# Patient Record
Sex: FEMALE | Race: OTHER | HISPANIC OR LATINO | URBAN - METROPOLITAN AREA
[De-identification: names, ages, dates, MRNs, and addresses within clinical notes are randomized per-mention and may not be internally consistent; named-entity substitution may affect disease eponyms.]

---

## 2019-10-30 ENCOUNTER — EMERGENCY (EMERGENCY)
Facility: HOSPITAL | Age: 59
LOS: 1 days | Discharge: HOME | End: 2019-10-30
Attending: EMERGENCY MEDICINE
Payer: SELF-PAY

## 2019-10-30 VITALS
DIASTOLIC BLOOD PRESSURE: 80 MMHG | HEIGHT: 62 IN | WEIGHT: 171.96 LBS | TEMPERATURE: 98 F | SYSTOLIC BLOOD PRESSURE: 143 MMHG | RESPIRATION RATE: 16 BRPM | HEART RATE: 85 BPM | OXYGEN SATURATION: 98 %

## 2019-10-30 DIAGNOSIS — Z98.89 OTHER SPECIFIED POSTPROCEDURAL STATES: Chronic | ICD-10-CM

## 2019-10-30 PROCEDURE — 72100 X-RAY EXAM L-S SPINE 2/3 VWS: CPT | Mod: 26

## 2019-10-30 PROCEDURE — 96372 THER/PROPH/DIAG INJ SC/IM: CPT

## 2019-10-30 PROCEDURE — 72040 X-RAY EXAM NECK SPINE 2-3 VW: CPT | Mod: 26

## 2019-10-30 PROCEDURE — 99284 EMERGENCY DEPT VISIT MOD MDM: CPT | Mod: 25

## 2019-10-30 PROCEDURE — T1013: CPT

## 2019-10-30 PROCEDURE — 72100 X-RAY EXAM L-S SPINE 2/3 VWS: CPT

## 2019-10-30 PROCEDURE — 99283 EMERGENCY DEPT VISIT LOW MDM: CPT

## 2019-10-30 PROCEDURE — 72040 X-RAY EXAM NECK SPINE 2-3 VW: CPT

## 2019-10-30 RX ORDER — IBUPROFEN 200 MG
1 TABLET ORAL
Qty: 20 | Refills: 0
Start: 2019-10-30 | End: 2019-11-03

## 2019-10-30 RX ORDER — METHOCARBAMOL 500 MG/1
500 TABLET, FILM COATED ORAL ONCE
Refills: 0 | Status: COMPLETED | OUTPATIENT
Start: 2019-10-30 | End: 2019-10-30

## 2019-10-30 RX ORDER — KETOROLAC TROMETHAMINE 30 MG/ML
30 SYRINGE (ML) INJECTION ONCE
Refills: 0 | Status: DISCONTINUED | OUTPATIENT
Start: 2019-10-30 | End: 2019-10-30

## 2019-10-30 RX ORDER — METHOCARBAMOL 500 MG/1
1 TABLET, FILM COATED ORAL
Qty: 12 | Refills: 0
Start: 2019-10-30 | End: 2019-11-02

## 2019-10-30 RX ADMIN — METHOCARBAMOL 500 MILLIGRAM(S): 500 TABLET, FILM COATED ORAL at 16:21

## 2019-10-30 RX ADMIN — Medication 30 MILLIGRAM(S): at 16:21

## 2019-10-30 NOTE — ED STATDOCS - CARE PLAN
Principal Discharge DX:	Fall, initial encounter  Assessment and plan of treatment:	Continue with pain medication   F/U with PCP  Secondary Diagnosis:	Musculoskeletal pain

## 2019-10-30 NOTE — ED STATDOCS - PATIENT PORTAL LINK FT
You can access the FollowMyHealth Patient Portal offered by [facility] by registering at the following website: [email or web address]. By joining [employer]s FollowMyHealth portal, you will also be able to view your health information using other applications (apps) compatible with our system.

## 2019-10-30 NOTE — ED STATDOCS - OBJECTIVE STATEMENT
60 y/o F pt with no significant PMHx presents to the ED c/o back pain s/p a fall at 14:15. Pt states that she slipped on water and landed on her back, and then began having trouble taking deep breath. She did hit her head, but denies any LOC. Denies being on any blood thinners. NKDA. No further complaints at this time.  PMD:   : Asha 60 y/o F pt with no significant PMHx presents to the ED c/o back pain s/p a fall at 14:15. Pt states that she slipped on water and landed on her back. She did hit her head, but denies any LOC. Denies being on any blood thinners. NKDA. No further complaints at this time.  PMD:   : Asha

## 2019-10-30 NOTE — ED STATDOCS - PROGRESS NOTE DETAILS
Pt X-ray negative for fracture and Pt was made aware of x-ray findings. Pt Feels a lot  better and will Continue pain medication and muscle relaxant.

## 2019-10-30 NOTE — ED STATDOCS - ATTENDING CONTRIBUTION TO CARE
I, Janay Mcginnis, performed a face to face bedside interview with this patient regarding history of present illness, review of symptoms and relevant past medical, social and family history.  I completed an independent physical examination. Medical decision making, follow-up on ordered tests (ie labs, radiologic studies) and re-evaluation of the patient's status has been communicated to the ACP.  Disposition of the patient will be based on test outcome and response to ED interventions.     57yo F with mechanical slip and fall onto back, no LOC. no nause/vomiting. complaint of back and neck paion. ambulatory. no focal weakness. no urinary/bowel changes.     xray c/l spine. motrin/robaxin reasses

## 2020-01-24 ENCOUNTER — APPOINTMENT (OUTPATIENT)
Dept: OBGYN | Facility: CLINIC | Age: 60
End: 2020-01-24
Payer: MEDICAID

## 2020-01-24 VITALS
DIASTOLIC BLOOD PRESSURE: 81 MMHG | BODY MASS INDEX: 29.86 KG/M2 | HEART RATE: 68 BPM | WEIGHT: 163.25 LBS | SYSTOLIC BLOOD PRESSURE: 134 MMHG

## 2020-01-24 DIAGNOSIS — N95.2 POSTMENOPAUSAL ATROPHIC VAGINITIS: ICD-10-CM

## 2020-01-24 DIAGNOSIS — N39.3 STRESS INCONTINENCE (FEMALE) (MALE): ICD-10-CM

## 2020-01-24 DIAGNOSIS — N90.5 ATROPHY OF VULVA: ICD-10-CM

## 2020-01-24 PROCEDURE — 99204 OFFICE O/P NEW MOD 45 MIN: CPT

## 2020-01-24 NOTE — PHYSICAL EXAM
[Awake] : awake [Alert] : alert [Acute Distress] : no acute distress [Mass] : no breast mass [Nipple Discharge] : no nipple discharge [Axillary LAD] : no axillary lymphadenopathy [Soft] : soft [Tender] : non tender [Oriented x3] : oriented to person, place, and time [Normal] : uterus [Atrophy] : atrophy [Cystocele] : a cystocele [Rectocele] : a rectocele [Dry Mucosa] : dry mucosa [Discharge] : no discharge [No Bleeding] : there was no active vaginal bleeding [Uterine Adnexae] : were not tender and not enlarged

## 2020-01-27 LAB
C TRACH RRNA SPEC QL NAA+PROBE: NOT DETECTED
HPV HIGH+LOW RISK DNA PNL CVX: NOT DETECTED
N GONORRHOEA RRNA SPEC QL NAA+PROBE: NOT DETECTED
SOURCE TP AMPLIFICATION: NORMAL

## 2020-01-31 LAB — CYTOLOGY CVX/VAG DOC THIN PREP: ABNORMAL

## 2020-02-28 ENCOUNTER — TRANSCRIPTION ENCOUNTER (OUTPATIENT)
Age: 60
End: 2020-02-28

## 2021-04-28 ENCOUNTER — TRANSCRIPTION ENCOUNTER (OUTPATIENT)
Age: 61
End: 2021-04-28

## 2021-05-08 ENCOUNTER — TRANSCRIPTION ENCOUNTER (OUTPATIENT)
Age: 61
End: 2021-05-08

## 2021-08-11 ENCOUNTER — TRANSCRIPTION ENCOUNTER (OUTPATIENT)
Age: 61
End: 2021-08-11

## 2021-09-13 ENCOUNTER — INPATIENT (INPATIENT)
Facility: HOSPITAL | Age: 61
LOS: 1 days | Discharge: HOME | DRG: 502 | End: 2021-09-15
Attending: ORTHOPAEDIC SURGERY | Admitting: ORTHOPAEDIC SURGERY
Payer: COMMERCIAL

## 2021-09-13 ENCOUNTER — TRANSCRIPTION ENCOUNTER (OUTPATIENT)
Age: 61
End: 2021-09-13

## 2021-09-13 VITALS
SYSTOLIC BLOOD PRESSURE: 146 MMHG | DIASTOLIC BLOOD PRESSURE: 81 MMHG | HEIGHT: 62 IN | OXYGEN SATURATION: 97 % | TEMPERATURE: 98 F | WEIGHT: 149.91 LBS | RESPIRATION RATE: 16 BRPM | HEART RATE: 72 BPM

## 2021-09-13 DIAGNOSIS — S42.402A UNSPECIFIED FRACTURE OF LOWER END OF LEFT HUMERUS, INITIAL ENCOUNTER FOR CLOSED FRACTURE: ICD-10-CM

## 2021-09-13 DIAGNOSIS — Z98.89 OTHER SPECIFIED POSTPROCEDURAL STATES: Chronic | ICD-10-CM

## 2021-09-13 LAB
ALBUMIN SERPL ELPH-MCNC: 4.3 G/DL — SIGNIFICANT CHANGE UP (ref 3.3–5.2)
ALP SERPL-CCNC: 86 U/L — SIGNIFICANT CHANGE UP (ref 40–120)
ALT FLD-CCNC: 18 U/L — SIGNIFICANT CHANGE UP
ANION GAP SERPL CALC-SCNC: 13 MMOL/L — SIGNIFICANT CHANGE UP (ref 5–17)
APTT BLD: 32.8 SEC — SIGNIFICANT CHANGE UP (ref 27.5–35.5)
AST SERPL-CCNC: 28 U/L — SIGNIFICANT CHANGE UP
BASOPHILS # BLD AUTO: 0.04 K/UL — SIGNIFICANT CHANGE UP (ref 0–0.2)
BASOPHILS NFR BLD AUTO: 0.6 % — SIGNIFICANT CHANGE UP (ref 0–2)
BILIRUB SERPL-MCNC: 0.3 MG/DL — LOW (ref 0.4–2)
BUN SERPL-MCNC: 16.7 MG/DL — SIGNIFICANT CHANGE UP (ref 8–20)
CALCIUM SERPL-MCNC: 9.7 MG/DL — SIGNIFICANT CHANGE UP (ref 8.6–10.2)
CHLORIDE SERPL-SCNC: 102 MMOL/L — SIGNIFICANT CHANGE UP (ref 98–107)
CO2 SERPL-SCNC: 25 MMOL/L — SIGNIFICANT CHANGE UP (ref 22–29)
CREAT SERPL-MCNC: 0.76 MG/DL — SIGNIFICANT CHANGE UP (ref 0.5–1.3)
EOSINOPHIL # BLD AUTO: 0.12 K/UL — SIGNIFICANT CHANGE UP (ref 0–0.5)
EOSINOPHIL NFR BLD AUTO: 1.7 % — SIGNIFICANT CHANGE UP (ref 0–6)
GLUCOSE SERPL-MCNC: 190 MG/DL — HIGH (ref 70–99)
HCT VFR BLD CALC: 39 % — SIGNIFICANT CHANGE UP (ref 34.5–45)
HGB BLD-MCNC: 13.1 G/DL — SIGNIFICANT CHANGE UP (ref 11.5–15.5)
IMM GRANULOCYTES NFR BLD AUTO: 0.1 % — SIGNIFICANT CHANGE UP (ref 0–1.5)
INR BLD: 1.06 RATIO — SIGNIFICANT CHANGE UP (ref 0.88–1.16)
LYMPHOCYTES # BLD AUTO: 1.73 K/UL — SIGNIFICANT CHANGE UP (ref 1–3.3)
LYMPHOCYTES # BLD AUTO: 24.4 % — SIGNIFICANT CHANGE UP (ref 13–44)
MCHC RBC-ENTMCNC: 28.3 PG — SIGNIFICANT CHANGE UP (ref 27–34)
MCHC RBC-ENTMCNC: 33.6 GM/DL — SIGNIFICANT CHANGE UP (ref 32–36)
MCV RBC AUTO: 84.2 FL — SIGNIFICANT CHANGE UP (ref 80–100)
MONOCYTES # BLD AUTO: 0.51 K/UL — SIGNIFICANT CHANGE UP (ref 0–0.9)
MONOCYTES NFR BLD AUTO: 7.2 % — SIGNIFICANT CHANGE UP (ref 2–14)
NEUTROPHILS # BLD AUTO: 4.67 K/UL — SIGNIFICANT CHANGE UP (ref 1.8–7.4)
NEUTROPHILS NFR BLD AUTO: 66 % — SIGNIFICANT CHANGE UP (ref 43–77)
PLATELET # BLD AUTO: 168 K/UL — SIGNIFICANT CHANGE UP (ref 150–400)
POTASSIUM SERPL-MCNC: 4 MMOL/L — SIGNIFICANT CHANGE UP (ref 3.5–5.3)
POTASSIUM SERPL-SCNC: 4 MMOL/L — SIGNIFICANT CHANGE UP (ref 3.5–5.3)
PROT SERPL-MCNC: 7.4 G/DL — SIGNIFICANT CHANGE UP (ref 6.6–8.7)
PROTHROM AB SERPL-ACNC: 12.3 SEC — SIGNIFICANT CHANGE UP (ref 10.6–13.6)
RBC # BLD: 4.63 M/UL — SIGNIFICANT CHANGE UP (ref 3.8–5.2)
RBC # FLD: 13 % — SIGNIFICANT CHANGE UP (ref 10.3–14.5)
SARS-COV-2 RNA SPEC QL NAA+PROBE: SIGNIFICANT CHANGE UP
SODIUM SERPL-SCNC: 140 MMOL/L — SIGNIFICANT CHANGE UP (ref 135–145)
WBC # BLD: 7.08 K/UL — SIGNIFICANT CHANGE UP (ref 3.8–10.5)
WBC # FLD AUTO: 7.08 K/UL — SIGNIFICANT CHANGE UP (ref 3.8–10.5)

## 2021-09-13 PROCEDURE — 99157 MOD SED OTHER PHYS/QHP EA: CPT

## 2021-09-13 PROCEDURE — 99222 1ST HOSP IP/OBS MODERATE 55: CPT

## 2021-09-13 PROCEDURE — 99221 1ST HOSP IP/OBS SF/LOW 40: CPT

## 2021-09-13 PROCEDURE — 72170 X-RAY EXAM OF PELVIS: CPT | Mod: 26

## 2021-09-13 PROCEDURE — 99285 EMERGENCY DEPT VISIT HI MDM: CPT | Mod: 25

## 2021-09-13 PROCEDURE — 71045 X-RAY EXAM CHEST 1 VIEW: CPT | Mod: 26

## 2021-09-13 PROCEDURE — 73080 X-RAY EXAM OF ELBOW: CPT | Mod: 26,LT,76

## 2021-09-13 PROCEDURE — 99156 MOD SED OTH PHYS/QHP 5/>YRS: CPT

## 2021-09-13 PROCEDURE — 99233 SBSQ HOSP IP/OBS HIGH 50: CPT | Mod: 57

## 2021-09-13 RX ORDER — GLUCAGON INJECTION, SOLUTION 0.5 MG/.1ML
1 INJECTION, SOLUTION SUBCUTANEOUS ONCE
Refills: 0 | Status: DISCONTINUED | OUTPATIENT
Start: 2021-09-13 | End: 2021-09-14

## 2021-09-13 RX ORDER — DEXTROSE 50 % IN WATER 50 %
25 SYRINGE (ML) INTRAVENOUS ONCE
Refills: 0 | Status: DISCONTINUED | OUTPATIENT
Start: 2021-09-13 | End: 2021-09-14

## 2021-09-13 RX ORDER — SODIUM CHLORIDE 9 MG/ML
1000 INJECTION, SOLUTION INTRAVENOUS
Refills: 0 | Status: DISCONTINUED | OUTPATIENT
Start: 2021-09-13 | End: 2021-09-14

## 2021-09-13 RX ORDER — CEFAZOLIN SODIUM 1 G
2000 VIAL (EA) INJECTION ONCE
Refills: 0 | Status: DISCONTINUED | OUTPATIENT
Start: 2021-09-14 | End: 2021-09-14

## 2021-09-13 RX ORDER — KETAMINE HYDROCHLORIDE 100 MG/ML
35 INJECTION INTRAMUSCULAR; INTRAVENOUS ONCE
Refills: 0 | Status: DISCONTINUED | OUTPATIENT
Start: 2021-09-13 | End: 2021-09-13

## 2021-09-13 RX ORDER — OXYCODONE HYDROCHLORIDE 5 MG/1
10 TABLET ORAL EVERY 4 HOURS
Refills: 0 | Status: DISCONTINUED | OUTPATIENT
Start: 2021-09-13 | End: 2021-09-14

## 2021-09-13 RX ORDER — DEXTROSE 50 % IN WATER 50 %
12.5 SYRINGE (ML) INTRAVENOUS ONCE
Refills: 0 | Status: DISCONTINUED | OUTPATIENT
Start: 2021-09-13 | End: 2021-09-14

## 2021-09-13 RX ORDER — HYDROMORPHONE HYDROCHLORIDE 2 MG/ML
0.5 INJECTION INTRAMUSCULAR; INTRAVENOUS; SUBCUTANEOUS EVERY 6 HOURS
Refills: 0 | Status: DISCONTINUED | OUTPATIENT
Start: 2021-09-13 | End: 2021-09-14

## 2021-09-13 RX ORDER — ONDANSETRON 8 MG/1
4 TABLET, FILM COATED ORAL ONCE
Refills: 0 | Status: COMPLETED | OUTPATIENT
Start: 2021-09-13 | End: 2021-09-13

## 2021-09-13 RX ORDER — MORPHINE SULFATE 50 MG/1
4 CAPSULE, EXTENDED RELEASE ORAL ONCE
Refills: 0 | Status: DISCONTINUED | OUTPATIENT
Start: 2021-09-13 | End: 2021-09-13

## 2021-09-13 RX ORDER — HYDROMORPHONE HYDROCHLORIDE 2 MG/ML
4 INJECTION INTRAMUSCULAR; INTRAVENOUS; SUBCUTANEOUS EVERY 4 HOURS
Refills: 0 | Status: DISCONTINUED | OUTPATIENT
Start: 2021-09-13 | End: 2021-09-14

## 2021-09-13 RX ORDER — DEXTROSE 50 % IN WATER 50 %
15 SYRINGE (ML) INTRAVENOUS ONCE
Refills: 0 | Status: DISCONTINUED | OUTPATIENT
Start: 2021-09-13 | End: 2021-09-14

## 2021-09-13 RX ORDER — INSULIN LISPRO 100/ML
VIAL (ML) SUBCUTANEOUS
Refills: 0 | Status: DISCONTINUED | OUTPATIENT
Start: 2021-09-13 | End: 2021-09-14

## 2021-09-13 RX ORDER — ACETAMINOPHEN 500 MG
975 TABLET ORAL EVERY 8 HOURS
Refills: 0 | Status: DISCONTINUED | OUTPATIENT
Start: 2021-09-13 | End: 2021-09-14

## 2021-09-13 RX ORDER — OXYCODONE HYDROCHLORIDE 5 MG/1
5 TABLET ORAL EVERY 4 HOURS
Refills: 0 | Status: DISCONTINUED | OUTPATIENT
Start: 2021-09-13 | End: 2021-09-14

## 2021-09-13 RX ADMIN — MORPHINE SULFATE 4 MILLIGRAM(S): 50 CAPSULE, EXTENDED RELEASE ORAL at 18:48

## 2021-09-13 RX ADMIN — HYDROMORPHONE HYDROCHLORIDE 0.5 MILLIGRAM(S): 2 INJECTION INTRAMUSCULAR; INTRAVENOUS; SUBCUTANEOUS at 23:22

## 2021-09-13 RX ADMIN — MORPHINE SULFATE 4 MILLIGRAM(S): 50 CAPSULE, EXTENDED RELEASE ORAL at 20:54

## 2021-09-13 RX ADMIN — SODIUM CHLORIDE 85 MILLILITER(S): 9 INJECTION, SOLUTION INTRAVENOUS at 23:59

## 2021-09-13 RX ADMIN — KETAMINE HYDROCHLORIDE 35 MILLIGRAM(S): 100 INJECTION INTRAMUSCULAR; INTRAVENOUS at 19:32

## 2021-09-13 RX ADMIN — HYDROMORPHONE HYDROCHLORIDE 0.5 MILLIGRAM(S): 2 INJECTION INTRAMUSCULAR; INTRAVENOUS; SUBCUTANEOUS at 23:59

## 2021-09-13 NOTE — CONSULT NOTE ADULT - ATTENDING COMMENTS
61F fall down stairs, no LOC,  seen and examined 09-13-21 @ 2320 as trauma consult.    GCS = 15  hemodynamically stable  NC/AT  PERRL  EOMI  no c-spine tenderness or limit in full active ROM  no t-spine or l-spine tenderness  no chest wall tenderness  soft / NT / ND  pelvic girdle stable  left arm in splint  no gross neurologic deficit    CXR - no pneumothorax, hemothorax or displaced rib fractures  X-ray pelvis - no fractures  left elbow X-ray - proximal radius / ulnar fractures      proximal left radius/ulnar fractures  -care as per orthopedic surgery

## 2021-09-13 NOTE — ED PROVIDER NOTE - PHYSICAL EXAMINATION
Constitutional - well-developed; well nourished. Head - NCAT. Airway patent. Eyes - PERRL. CV - RRR. no murmur. no edema. Pulm - CTAB. Abd - soft, nt. no rebound. no guarding. Neuro - A&Ox3. strength 5/5 x4. sensation intact x4. normal gait. Skin - No rash. MSK - L elbow ttp. nvi distally. no c-spine ttp.

## 2021-09-13 NOTE — H&P ADULT - ATTENDING COMMENTS
Orthopaedic Trauma Surgeon Addendum:    I have personally performed a face-to-face diagnostic evaluation on this patient.  I have reviewed the physician assistant note and agree with the history, exam, and plan of care, except as noted.    Orthopedic Surgery is ready to proceed with surgery pending medical optimization and adequate Operating Room availability. Risks of surgical delay discussed with other providers and staff. Please call with any questions or concerns.     Steven Martell MD  Orthopaedic Trauma Surgeon  [facility] [place]

## 2021-09-13 NOTE — ED ADULT NURSE NOTE - OBJECTIVE STATEMENT
pt with injury to left arm s.p fall, deformity noted to left elbow. pt AOX3, c.o 10/10 pain. states fall down 3 steps, no loc no head injury, even and unlabored resps, mechanical fall.

## 2021-09-13 NOTE — ED PROVIDER NOTE - CLINICAL SUMMARY MEDICAL DECISION MAKING FREE TEXT BOX
Pt with fall onto L elbow resulting in comminuted elbow Fx.  ortho splinted. trauma cleared for ortho to admit. Dr. Jauregui to admit for operative management.

## 2021-09-13 NOTE — ED ADULT NURSE REASSESSMENT NOTE - NS ED NURSE REASSESS COMMENT FT1
pt aware of need for conscious sedation. pt awake, alert, ortho at bedside, pt prepped for procedure. consent signed, IV site patent, VSS, ETc02 monitoring in place.

## 2021-09-13 NOTE — ED ADULT TRIAGE NOTE - CHIEF COMPLAINT QUOTE
Patient BIBA from home, patient states that she tripped and fell down 3 stairs at home. C/O pain to her left ankle and left elbow. Denies hitting her head. -LOC

## 2021-09-13 NOTE — ED PROVIDER NOTE - OBJECTIVE STATEMENT
Pt is a 60 yo F co fall.  Pt states that she was coming out of her house when she missed a step and fell 4-5 stairs primarily onto her L elbow. Pt states that since then she has had severe L elbow pain. Pt also hit her L ankle and head. pt did not have any loss of consciousness. no AC. no other complaints.

## 2021-09-13 NOTE — CONSULT NOTE ADULT - ASSESSMENT
ASSESSMENT: Patient is a 61y old female with PMH of DM presented s/p mechanical fall with isolated elbow fx, and no other injuries identified on exam.     PLAN:    - Dispo per Ortho surgery  - f/u CXR/pelvic xr    - Plan discussed with Attending, Dr. Grey     ASSESSMENT: Patient is a 61y old female with PMH of DM presented s/p mechanical fall with isolated elbow fx, and no other injuries identified on exam.     PLAN:    - Trauma surgery admission not indicated  - Dispo per Ortho surgery  - f/u CXR/pelvic xr    - Plan discussed with Attending, Dr. Grey     ASSESSMENT: Patient is a 61y old female with PMH of DM presented s/p mechanical fall with isolated elbow fx, and no other injuries identified on exam.     PLAN:    - Trauma surgery admission not indicated  - Dispo per Ortho surgery  - f/u CXR/pelvic xr  - Will require a tertiary post op     - Plan discussed with Attending, Dr. Grey

## 2021-09-13 NOTE — H&P ADULT - HISTORY OF PRESENT ILLNESS
Pt Name: REYNALDO DIAZ    MRN: [id number]      Patient is a 61y Female presenting to the emergency department with a chief complaint of left elbow pain s/p fall down approx 4 stairs. Pt denies LOC, h/a, neck/back pain, c/p, sob, abdominal pain, n/v, numbness/tingling/weakness, and has no other complaints at this time.    REVIEW OF SYSTEMS    General: Alert, responsive, in NAD    Skin/Breast: No rashes, no pruritis   	  Ophthalmologic: No visual changes. No redness.   	  ENMT:	No discharge. No swelling.    Respiratory and Thorax: No difficulty breathing. No cough.  	   Cardiovascular:	No chest pain. No palpitations.    Gastrointestinal:	 No abdominal pain. No diarrhea.     Genitourinary: No dysuria. No bleeding.    Musculoskeletal: SEE HPI.    Neurological: No sensory or motor changes.     Psychiatric: No anxiety or depression.    Hematology/Lymphatics: No swelling.    Endocrine: No Hx of diabetes.    ROS is otherwise negative.    PAST MEDICAL & SURGICAL HISTORY:  PAST MEDICAL & SURGICAL HISTORY:  No pertinent past medical history    S/P   x2        Allergies: No Known Allergies      Medications: acetaminophen   Tablet .. 975 milliGRAM(s) Oral every 8 hours  dextrose 40% Gel 15 Gram(s) Oral once  dextrose 5%. 1000 milliLiter(s) IV Continuous <Continuous>  dextrose 5%. 1000 milliLiter(s) IV Continuous <Continuous>  dextrose 50% Injectable 25 Gram(s) IV Push once  dextrose 50% Injectable 12.5 Gram(s) IV Push once  dextrose 50% Injectable 25 Gram(s) IV Push once  glucagon  Injectable 1 milliGRAM(s) IntraMuscular once  HYDROmorphone   Tablet 4 milliGRAM(s) Oral every 4 hours PRN  HYDROmorphone  Injectable 0.5 milliGRAM(s) IV Push every 6 hours PRN  insulin lispro (ADMELOG) corrective regimen sliding scale   SubCutaneous three times a day before meals  oxyCODONE    IR 5 milliGRAM(s) Oral every 4 hours PRN  oxyCODONE    IR 10 milliGRAM(s) Oral every 4 hours PRN      FAMILY HISTORY:  Family history of bronchitis (Father)    Family history of diabetes mellitus (Mother)    : non-contributory    Social History: Denies ETOH abuse.    Ambulation: Walking independently [ x ] With Cane [ ] With Walker [ ]  Bedbound [ ]                           13.1   7.08  )-----------( 168      ( 13 Sep 2021 18:50 )             39.0       09-13    140  |  102  |  16.7  ----------------------------<  190<H>  4.0   |  25.0  |  0.76    Ca    9.7      13 Sep 2021 18:50    TPro  7.4  /  Alb  4.3  /  TBili  0.3<L>  /  DBili  x   /  AST  28  /  ALT  18  /  AlkPhos  86  09-13      Vital Signs Last 24 Hrs  T(C): 36.8 (13 Sep 2021 17:46), Max: 36.8 (13 Sep 2021 17:46)  T(F): 98.3 (13 Sep 2021 17:46), Max: 98.3 (13 Sep 2021 17:46)  HR: 76 (13 Sep 2021 20:00) (72 - 104)  BP: 155/82 (13 Sep 2021 20:00) (146/81 - 170/88)  BP(mean): --  RR: 18 (13 Sep 2021 20:00) (16 - 18)  SpO2: 99% (13 Sep 2021 20:00) (97% - 100%)    Daily Height in cm: 157.48 (13 Sep 2021 17:46)    Daily       PHYSICAL EXAM:      Appearance: Alert, responsive, in no acute distress.    Neurological: Sensation is grossly intact to light touch. 5/5 motor function of all extremities. No focal deficits or weaknesses found.    Skin: no rash on visible skin. Skin is clean, dry and intact. No bleeding. No abrasions. No ulcerations.    Vascular: 2+ distal radial/DP/PT pulses. Cap refill < 2 sec. No signs of venous insufficiency or stasis. No extremity ulcerations. No cyanosis.    Musculoskeletal:         Left Upper Extremity: + TTP of the left elbow with mild deformity noted, SILT, 2+ radial pulse, cap refill < 2sec, +flexion/extension of wrist, +flexion/extension/abduction/adduction of all digits. Compartments soft and compressible, No open wounds or active bleeding noted.       Right Upper Extremity:  + NROM. Non-tender. No signs of trauma.        Left Lower Extremity:  + NROM. Non-tender. No signs of trauma.        Right Lower Extremity:  + NROM. Non-tender. No signs of trauma.     Imaging Studies: All imaging reviewed with Dr. Martell    Procedure: JOINT REDUCTION  PROCEDURE NOTE: Joint reduction     Performed by: Peña Sommers PA-C    Indication: Dislocation of  left elbow    Consent: The risks and benefits of the procedure including incomplete reduction, secondary fracture, nerve damage and bleeding were explained and the patient verbalized their understanding and wished to proceed with the procedure. Written consent was obtained following the discussion.    Universal Protocol: a time out was performed and the correct patient and site were verified     Procedure: Neurovascular exam intact prior to joint reduction.  Skin exam: no bleeding or lacerations at the fracture site. Conscious sedation performed by emergency department attending physician. Reduction of the left elbow was accomplished via traction and careful manipulation.  The joint was immobilized with splint.  Distally, the extremity was neurovascular intact following the procedure.  The patient tolerated the procedure well.    Post reduction films obtained and demonstrated an adequate reduction.    Complications: None     A/P:  Pt is a  61y Female with a left elbow fx/dislocation, radial head fx s/p fall today.    PLAN d/w Dr. Martell:   * NPO for OR tomorrow  * IV fluids ordered and to start once NPO  * Pre-operative ABX ordered  * Medical clearance requested for procedure  * QUINTIN MOURA

## 2021-09-13 NOTE — ED ADULT NURSE REASSESSMENT NOTE - NS ED NURSE REASSESS COMMENT FT1
pt awake and alert even and unlabored resps present, skin warm dry and intact, IV site patent. pt to be admitted, awaiting admission. pt in no distress

## 2021-09-13 NOTE — ED PROCEDURE NOTE - NS_POSTPROCCAREGUIDE_ED_ALL_ED
Patient is now fully awake, with vital signs and temperature stable, hydration is adequate, patients Fariha’s  score is at baseline (or greater than 8), patient and escort has received  discharge education.

## 2021-09-13 NOTE — CONSULT NOTE ADULT - SUBJECTIVE AND OBJECTIVE BOX
TRAUMA CONSULT     HPI: 61y Female present to ED s/p mechanical fall down 4-5 concrete steps when she lost her footing. She is accompanied by her son. She attempted to brace her fall with her L arm but ultimately fell on her L side and face. Reporting only pain is in her L elbow and some discomfort in her L breast, non-reproducible. Denies LOC. Denies SOB, abdominal pain.     Consult called at:   Consult seen at:     ROS: 10-system review is otherwise negative except HPI above.      PAST MEDICAL & SURGICAL HISTORY:  No pertinent past medical history    S/P   x2      FAMILY HISTORY:  Family history of bronchitis (Father)    Family history of diabetes mellitus (Mother)      Family history not pertinent as reviewed with the patient.    SOCIAL HISTORY:  Denies any toxic habits    ALLERGIES: NKA No Known Allergies      HOME MEDICATIONS:      --------------------------------------------------------------------------------------------    PHYSICAL EXAM:   General: NAD, Lying in bed comfortably  Neuro: CN II-XII grossly intact  HEENT: NC/AT  Cardio: RRR  Resp: Non labored breathing on RA, CTA BL  GI/Abd: Soft, NT/ND, no rebound/guarding, no masses palpated  Vascular: All 4 extremities warm and well perfused.   Pelvis: stable  Musculoskeletal: All 4 extremities moving spontaneously, LUE in splint and ACE wrap  --------------------------------------------------------------------------------------------    LABS                 13.1   7.08   )----------(  168       ( 13 Sep 2021 18:50 )               39.0      140    |  102    |  16.7   ----------------------------<  190        ( 13 Sep 2021 18:50 )  4.0     |  25.0   |  0.76     Ca    9.7        ( 13 Sep 2021 18:50 )    TPro  7.4    /  Alb  4.3    /  TBili  0.3    /  DBili  x      /  AST  28     /  ALT  18     /  AlkPhos  86     ( 13 Sep 2021 18:50 )    LIVER FUNCTIONS - ( 13 Sep 2021 18:50 )  Alb: 4.3 g/dL / Pro: 7.4 g/dL / ALK PHOS: 86 U/L / ALT: 18 U/L / AST: 28 U/L / GGT: x           PT/INR -  12.3 sec / 1.06 ratio   ( 13 Sep 2021 18:50 )       PTT -  32.8 sec   ( 13 Sep 2021 18:50 )  CAPILLARY BLOOD GLUCOSE              --------------------------------------------------------------------------------------------  IMAGING  CXR pending    PXR pending

## 2021-09-14 ENCOUNTER — TRANSCRIPTION ENCOUNTER (OUTPATIENT)
Age: 61
End: 2021-09-14

## 2021-09-14 LAB
A1C WITH ESTIMATED AVERAGE GLUCOSE RESULT: 6.8 % — HIGH (ref 4–5.6)
ESTIMATED AVERAGE GLUCOSE: 148 MG/DL — HIGH (ref 68–114)
GLUCOSE BLDC GLUCOMTR-MCNC: 148 MG/DL — HIGH (ref 70–99)
GLUCOSE BLDC GLUCOMTR-MCNC: 217 MG/DL — HIGH (ref 70–99)
GLUCOSE BLDC GLUCOMTR-MCNC: 218 MG/DL — HIGH (ref 70–99)
GLUCOSE BLDC GLUCOMTR-MCNC: 228 MG/DL — HIGH (ref 70–99)
HCV AB S/CO SERPL IA: 0.13 S/CO — SIGNIFICANT CHANGE UP (ref 0–0.99)
HCV AB SERPL-IMP: SIGNIFICANT CHANGE UP

## 2021-09-14 PROCEDURE — 99232 SBSQ HOSP IP/OBS MODERATE 35: CPT

## 2021-09-14 PROCEDURE — 93010 ELECTROCARDIOGRAM REPORT: CPT

## 2021-09-14 PROCEDURE — 99231 SBSQ HOSP IP/OBS SF/LOW 25: CPT | Mod: GC

## 2021-09-14 PROCEDURE — 24685 OPTX ULNAR FX PROX END W/FIX: CPT | Mod: LT

## 2021-09-14 PROCEDURE — 24635 OPTX MONTEGGIA FX DISLC ELBW: CPT | Mod: LT

## 2021-09-14 PROCEDURE — 73080 X-RAY EXAM OF ELBOW: CPT | Mod: 26,LT

## 2021-09-14 PROCEDURE — 73200 CT UPPER EXTREMITY W/O DYE: CPT | Mod: 26,LT

## 2021-09-14 PROCEDURE — 24666 OPTX RADIAL HEAD/NCK FX RPLC: CPT | Mod: LT

## 2021-09-14 RX ORDER — CEFAZOLIN SODIUM 1 G
2000 VIAL (EA) INJECTION EVERY 8 HOURS
Refills: 0 | Status: COMPLETED | OUTPATIENT
Start: 2021-09-14 | End: 2021-09-15

## 2021-09-14 RX ORDER — DEXTROSE 50 % IN WATER 50 %
25 SYRINGE (ML) INTRAVENOUS ONCE
Refills: 0 | Status: DISCONTINUED | OUTPATIENT
Start: 2021-09-14 | End: 2021-09-15

## 2021-09-14 RX ORDER — SODIUM CHLORIDE 9 MG/ML
1000 INJECTION, SOLUTION INTRAVENOUS
Refills: 0 | Status: DISCONTINUED | OUTPATIENT
Start: 2021-09-14 | End: 2021-09-15

## 2021-09-14 RX ORDER — SODIUM CHLORIDE 9 MG/ML
1000 INJECTION, SOLUTION INTRAVENOUS
Refills: 0 | Status: DISCONTINUED | OUTPATIENT
Start: 2021-09-14 | End: 2021-09-14

## 2021-09-14 RX ORDER — OXYCODONE HYDROCHLORIDE 5 MG/1
10 TABLET ORAL EVERY 4 HOURS
Refills: 0 | Status: DISCONTINUED | OUTPATIENT
Start: 2021-09-14 | End: 2021-09-15

## 2021-09-14 RX ORDER — PROCHLORPERAZINE MALEATE 5 MG
5 TABLET ORAL ONCE
Refills: 0 | Status: DISCONTINUED | OUTPATIENT
Start: 2021-09-14 | End: 2021-09-15

## 2021-09-14 RX ORDER — INSULIN LISPRO 100/ML
VIAL (ML) SUBCUTANEOUS EVERY 6 HOURS
Refills: 0 | Status: DISCONTINUED | OUTPATIENT
Start: 2021-09-14 | End: 2021-09-15

## 2021-09-14 RX ORDER — ONDANSETRON 8 MG/1
4 TABLET, FILM COATED ORAL EVERY 6 HOURS
Refills: 0 | Status: DISCONTINUED | OUTPATIENT
Start: 2021-09-14 | End: 2021-09-15

## 2021-09-14 RX ORDER — MAGNESIUM HYDROXIDE 400 MG/1
30 TABLET, CHEWABLE ORAL DAILY
Refills: 0 | Status: DISCONTINUED | OUTPATIENT
Start: 2021-09-14 | End: 2021-09-15

## 2021-09-14 RX ORDER — DIPHENHYDRAMINE HCL 50 MG
25 CAPSULE ORAL AT BEDTIME
Refills: 0 | Status: DISCONTINUED | OUTPATIENT
Start: 2021-09-14 | End: 2021-09-15

## 2021-09-14 RX ORDER — OXYCODONE HYDROCHLORIDE 5 MG/1
5 TABLET ORAL EVERY 4 HOURS
Refills: 0 | Status: DISCONTINUED | OUTPATIENT
Start: 2021-09-14 | End: 2021-09-15

## 2021-09-14 RX ORDER — FAMOTIDINE 10 MG/ML
20 INJECTION INTRAVENOUS EVERY 12 HOURS
Refills: 0 | Status: DISCONTINUED | OUTPATIENT
Start: 2021-09-14 | End: 2021-09-15

## 2021-09-14 RX ORDER — INSULIN GLARGINE 100 [IU]/ML
10 INJECTION, SOLUTION SUBCUTANEOUS ONCE
Refills: 0 | Status: COMPLETED | OUTPATIENT
Start: 2021-09-14 | End: 2021-09-14

## 2021-09-14 RX ORDER — TRAMADOL HYDROCHLORIDE 50 MG/1
50 TABLET ORAL EVERY 4 HOURS
Refills: 0 | Status: DISCONTINUED | OUTPATIENT
Start: 2021-09-14 | End: 2021-09-15

## 2021-09-14 RX ORDER — FENTANYL CITRATE 50 UG/ML
50 INJECTION INTRAVENOUS
Refills: 0 | Status: DISCONTINUED | OUTPATIENT
Start: 2021-09-14 | End: 2021-09-14

## 2021-09-14 RX ORDER — INSULIN LISPRO 100/ML
VIAL (ML) SUBCUTANEOUS EVERY 6 HOURS
Refills: 0 | Status: DISCONTINUED | OUTPATIENT
Start: 2021-09-14 | End: 2021-09-14

## 2021-09-14 RX ORDER — INSULIN GLARGINE 100 [IU]/ML
10 INJECTION, SOLUTION SUBCUTANEOUS AT BEDTIME
Refills: 0 | Status: DISCONTINUED | OUTPATIENT
Start: 2021-09-14 | End: 2021-09-15

## 2021-09-14 RX ORDER — DEXTROSE 50 % IN WATER 50 %
12.5 SYRINGE (ML) INTRAVENOUS ONCE
Refills: 0 | Status: DISCONTINUED | OUTPATIENT
Start: 2021-09-14 | End: 2021-09-15

## 2021-09-14 RX ORDER — INSULIN GLARGINE 100 [IU]/ML
10 INJECTION, SOLUTION SUBCUTANEOUS AT BEDTIME
Refills: 0 | Status: DISCONTINUED | OUTPATIENT
Start: 2021-09-14 | End: 2021-09-14

## 2021-09-14 RX ORDER — ONDANSETRON 8 MG/1
4 TABLET, FILM COATED ORAL ONCE
Refills: 0 | Status: DISCONTINUED | OUTPATIENT
Start: 2021-09-14 | End: 2021-09-14

## 2021-09-14 RX ORDER — HYDROMORPHONE HYDROCHLORIDE 2 MG/ML
0.5 INJECTION INTRAMUSCULAR; INTRAVENOUS; SUBCUTANEOUS
Refills: 0 | Status: DISCONTINUED | OUTPATIENT
Start: 2021-09-14 | End: 2021-09-14

## 2021-09-14 RX ORDER — DEXTROSE 50 % IN WATER 50 %
15 SYRINGE (ML) INTRAVENOUS ONCE
Refills: 0 | Status: DISCONTINUED | OUTPATIENT
Start: 2021-09-14 | End: 2021-09-15

## 2021-09-14 RX ADMIN — SODIUM CHLORIDE 85 MILLILITER(S): 9 INJECTION, SOLUTION INTRAVENOUS at 05:57

## 2021-09-14 RX ADMIN — Medication 100 MILLIGRAM(S): at 22:20

## 2021-09-14 RX ADMIN — FAMOTIDINE 20 MILLIGRAM(S): 10 INJECTION INTRAVENOUS at 19:59

## 2021-09-14 RX ADMIN — OXYCODONE HYDROCHLORIDE 5 MILLIGRAM(S): 5 TABLET ORAL at 23:49

## 2021-09-14 RX ADMIN — OXYCODONE HYDROCHLORIDE 5 MILLIGRAM(S): 5 TABLET ORAL at 11:10

## 2021-09-14 RX ADMIN — Medication 4: at 23:47

## 2021-09-14 RX ADMIN — SODIUM CHLORIDE 75 MILLILITER(S): 9 INJECTION, SOLUTION INTRAVENOUS at 19:59

## 2021-09-14 RX ADMIN — HYDROMORPHONE HYDROCHLORIDE 0.5 MILLIGRAM(S): 2 INJECTION INTRAMUSCULAR; INTRAVENOUS; SUBCUTANEOUS at 06:09

## 2021-09-14 RX ADMIN — INSULIN GLARGINE 10 UNIT(S): 100 INJECTION, SOLUTION SUBCUTANEOUS at 22:19

## 2021-09-14 RX ADMIN — INSULIN GLARGINE 10 UNIT(S): 100 INJECTION, SOLUTION SUBCUTANEOUS at 02:26

## 2021-09-14 RX ADMIN — Medication 4: at 17:45

## 2021-09-14 RX ADMIN — HYDROMORPHONE HYDROCHLORIDE 0.5 MILLIGRAM(S): 2 INJECTION INTRAMUSCULAR; INTRAVENOUS; SUBCUTANEOUS at 05:53

## 2021-09-14 RX ADMIN — SODIUM CHLORIDE 125 MILLILITER(S): 9 INJECTION, SOLUTION INTRAVENOUS at 22:21

## 2021-09-14 NOTE — BRIEF OPERATIVE NOTE - NSICDXBRIEFPROCEDURE_GEN_ALL_CORE_FT
PROCEDURES:  Open repair, fracture, periarticular, with elbow dislocation 14-Sep-2021 14:36:01  Steven Martell

## 2021-09-14 NOTE — DISCHARGE NOTE PROVIDER - NSDCMRMEDTOKEN_GEN_ALL_CORE_FT
acetaminophen-oxyCODONE 325 mg-5 mg oral tablet: 1 tab(s) orally every 6 hours prn severe pain   mg oral tablet: 1 tab(s) orally every 6 hours MDD:4  Robaxin 500 mg oral tablet: 1 tab(s) orally 3 times a day MDD:3   acetaminophen 500 mg oral capsule: 2 cap(s) orally every 8 hours   famotidine 20 mg oral tablet: 1 tab(s) orally every 12 hours  oxyCODONE 5 mg oral tablet: 1 tab(s) orally every 4 hours, As needed, Moderate Pain (4 - 6) MDD:six  Robaxin 500 mg oral tablet: 1 tab(s) orally 3 times a day MDD:3  Senna S 50 mg-8.6 mg oral tablet: 2 tab(s) orally once a day (at bedtime)

## 2021-09-14 NOTE — DISCHARGE NOTE PROVIDER - CARE PROVIDER_API CALL
Steven Martell)  Orthopaedic Surgery  [address] [place] [address]  Phone: [phone]  Fax: [phone]  Follow Up Time:

## 2021-09-14 NOTE — DISCHARGE NOTE PROVIDER - NSDCCPTREATMENT_GEN_ALL_CORE_FT
PRINCIPAL PROCEDURE  Procedure: Open repair, fracture, periarticular, with elbow dislocation  Findings and Treatment:

## 2021-09-14 NOTE — DISCHARGE NOTE PROVIDER - NSDCFUADDINST_GEN_ALL_CORE_FT
The patient will be seen in the office between 2-3 weeks for splint removal and wound check. Sutures/Staples will be removed at that time. Patient may NOT shower until after re-evaluation in the office. The patient will continue with splint as applied in hospital and not remove until re-evaluation in the office. The patient will contact the office if the wound becomes red, has increasing pain, develops bleeding or discharge, an injury occurs, or has other concerns. The patient will continue ASPIRIN for 4 weeks for DVTP. The patient will take oxycodone/tylenol for pain control and titrate according to prescription and patient needs. The patient is NON-weight bearing on the LEFT UPPER extremity. The patient is recommended to elevated the affected extremity to reduce swelling. If the splint/cast  becomes too tight, the patient is to immediately elevate and contact the office to discuss further management or immediately proceed to the office if unable to make contact with the office.  The patient will be seen in the office in 2 weeks for splint removal and wound check. Sutures will be removed at that time. Patient may shower but must keep the splint dry. The patient will continue with splint as applied in hospital and not remove until re-evaluation in the office. The patient will contact the office if the wound becomes red, has increasing pain, develops bleeding or discharge, an injury occurs, or has other concerns. The patient will continue ASPIRIN 81mg twice daily for 4 weeks for DVTP. The patient will take oxycodone & Tylenol for pain control and titrate according to prescription and patient needs. The patient is NON-weight bearing on the LEFT UPPER extremity. The patient is recommended to elevated the affected extremity to reduce swelling. If the splint/cast  becomes too tight, the patient is to immediately elevate and contact the office to discuss further management or immediately proceed to the office if unable to make contact with the office.

## 2021-09-14 NOTE — CHART NOTE - NSCHARTNOTEFT_GEN_A_CORE
Tertiary Trauma Survey (TTS)    Date of TTS: 09-14-21 @ 09:41                             Admit Date: 09-13-21 @ 21:42      Trauma Activation:      Subjective / 24 hour events:  no new complaints    Vital Signs Last 24 Hrs  T(C): 36.9 (14 Sep 2021 08:26), Max: 37.2 (14 Sep 2021 04:36)  T(F): 98.4 (14 Sep 2021 08:26), Max: 98.9 (14 Sep 2021 04:36)  HR: 74 (14 Sep 2021 08:26) (72 - 104)  BP: 127/78 (14 Sep 2021 08:26) (122/79 - 170/88)  BP(mean): --  RR: 18 (14 Sep 2021 08:26) (16 - 18)  SpO2: 96% (14 Sep 2021 08:26) (96% - 100%)    Physical Exam:    Neuro: [x ] non focal neurological exam [ ] Focal Neurological deficits noted to be:     HEENT: [ x] Normo-cephalic/atraumatic  [ ] abnormalities noted to be:    Pulm/Chest:  [ x] CTA b/l  [x ] chest wall non tender  [ ] abnormalities noted to be:    Cardiac: [ x] S1S2, sinus rhythm  [ ] abnormalities noted to be:     GI / Abdomen: [x ] Soft, non-tender, non-distended [ ] abnormalities noted to be:    Musculoskeletal / Extremities: [ ]normal active ROM  [ x]  abnormalities noted to be: R arm splinted, neurovascularly intact distally    Integumentary: [x ] Skin intact [ ] Warm [ ] Dry [ ]abnormalities noted to be:    Vascular: [ x] 2+ palpable distal pulses  [ ] JUAN C:       [ ] abnormalities noted to be:      List Injuries Identified to Date: L elbow Fx    List Operative and Interventional Radiological Procedures:     Consults (Date):  [  ] Neurosurgery   [ x ] Orthopedics  [  ] Plastics  [  ] Urology  [  ] PM&R  [  ] Social Work    RADIOLOGICAL FINDINGS REVIEW:    CXR: official read pending    Pelvis Films: official read pending    C-Spine Films: official read pending     T/L/S Spine Films:    Extremity Films: official read pending    Head CT:    C-Spine CT:    Neck CT:    Chest CT:    ABD/Pelvis CT:    Other:  CT Elbow: Comminuted displaced radial head fracture.  Fracture at the junction of the olecranon process and proximal shaft of the ulna and avulsion fracture at the coronoid process of the ulna.  Small avulsion fracture at the posterior aspect the lateral humeral epicondyle.  The medial ulnar is subluxed slightly posteriorly with the alignment improved since the initial radiographs from the same day.    61F s/p fall with elbow fracture    - isolated elbow injury  - admitted to Orthopedics  - follow up official reads of all xrays  - Trauma to sign off, please recall with any questions/concerns

## 2021-09-14 NOTE — CONSULT NOTE ADULT - SUBJECTIVE AND OBJECTIVE BOX
CC: Fall with left elbow fracture  HPI: 60 y/o female who was in her usual state of health when she tripped and fell down 4 steps last night. The incident was witnessed by her family. She landed on her left side and sustained a left elbow dislocation/fracture. No recent illnesses, travel, medication changes, or sick contacts. Denies LOC, does recall hitting left side of her face. Denies neck pain or focal weakness. In the ED she was evaluated ED and had left elbow reduced with splint. She was seen and evaluated by Ortho and will need ORIF of elbow injury.   PMHx: DM-2, HLD  Psurghx: Left distal radius fx, c section x2  All: NKDA  Social Hx: Denies etoh, smoking, or illicit drug use  ROS: As per HPI, remainder of systems reported as negative  Medications: Metformin 1000mg PO BID  Famhx: Diabetes in Mother, COPD in Father  PE:  VS: Afebrile 122/76, 18, 72R, 98% on RA, pain 3/10  GEN: Female lying in bed in NAD w/ son at bedside   HEENT: abrasions to left side of face, MMM, no oral lesions  NECK: FROM  CVS: S1S2 + no m/r/g  PULM: CTA B/L  ABD: Soft, obese, non tender   EXTREM: Left UE in splint, distal extrem N/V intact, cap refill < 2 seconds, B/L LE no C/C/E  SKIN: warm, no rash   NEURO: AAOX4  PSYCH: Normal affect     EKG: Pending   CXR: No over infiltrates/effusions   LABS: Reviewed, glucose 192, remainder unremarkable     A/P: 60 y/o female s/p mechanical fall with left elbow fracture/dislocation, Hx of DM-2, HLD  -s/p reduction in ED, await ORIF in AM by Orthopedic service  -Patient with normal exercise tolerance prior to event, no cardiac history, no overt signs of CHF, labs essentially normal   -Await EKG to be completed  -LABS/CXR as above  -Change Accu checks to AC/HS while NPO, cont. IVF, add basal insulin coverage, hold Metformin until DC  -Pain regimen, N/V checks per Ortho service, incentive spirometer, VC boots while in bed    Discussed with Son at bedside, Ortho PA  Medicine will follow with you CC: Fall with left elbow fracture  HPI: 60 y/o female who was in her usual state of health when she tripped and fell down 4 steps last night. The incident was witnessed by her family. She landed on her left side and sustained a left elbow dislocation/fracture. No recent illnesses, travel, medication changes, or sick contacts. Denies LOC, does recall hitting left side of her face. Denies neck pain or focal weakness. In the ED she was evaluated ED and had left elbow reduced with splint. She was seen and evaluated by Ortho and will need ORIF of elbow injury.   PMHx: DM-2, HLD  Psurghx: Left distal radius fx, c section x2  All: NKDA  Social Hx: Denies etoh, smoking, or illicit drug use  ROS: As per HPI, remainder of systems reported as negative  Medications: Metformin 1000mg PO BID  Famhx: Diabetes in Mother, COPD in Father  PE:  VS: Afebrile 122/76, 18, 72R, 98% on RA, pain 3/10  GEN: Female lying in bed in NAD w/ son at bedside   HEENT: abrasions to left side of face, MMM, no oral lesions  NECK: FROM  CVS: S1S2 + no m/r/g  PULM: CTA B/L  ABD: Soft, obese, non tender   EXTREM: Left UE in splint, distal extrem N/V intact, cap refill < 2 seconds, B/L LE no C/C/E  SKIN: warm, no rash   NEURO: AAOX4  PSYCH: Normal affect     EKG: NSR normal segments/intervals   CXR: No over infiltrates/effusions   LABS: Reviewed, glucose 192, remainder unremarkable     A/P: 60 y/o female s/p mechanical fall with left elbow fracture/dislocation, Hx of DM-2, HLD  -s/p reduction in ED, await ORIF in AM by Orthopedic service  -Patient with normal exercise tolerance prior to event, no cardiac history, no overt signs of CHF, labs essentially normal   -EKG as above   -LABS/CXR as above  -Change Accu checks to AC/HS while NPO, cont. IVF, add basal insulin coverage, hold Metformin until DC  -Pain regimen, N/V checks per Ortho service, incentive spirometer, VC boots while in bed  -RCRI 0, patient may proceed to OR with no further pre-operative testing indicated.     Discussed with Son at bedside, Ortho PA  Medicine will follow with you

## 2021-09-14 NOTE — DISCHARGE NOTE PROVIDER - NSDCCPCAREPLAN_GEN_ALL_CORE_FT
PRINCIPAL DISCHARGE DIAGNOSIS  Diagnosis: Left elbow fracture  Assessment and Plan of Treatment:

## 2021-09-15 ENCOUNTER — TRANSCRIPTION ENCOUNTER (OUTPATIENT)
Age: 61
End: 2021-09-15

## 2021-09-15 VITALS
TEMPERATURE: 98 F | HEART RATE: 68 BPM | RESPIRATION RATE: 18 BRPM | SYSTOLIC BLOOD PRESSURE: 142 MMHG | OXYGEN SATURATION: 95 % | DIASTOLIC BLOOD PRESSURE: 85 MMHG

## 2021-09-15 LAB
A1C WITH ESTIMATED AVERAGE GLUCOSE RESULT: 6.8 % — HIGH (ref 4–5.6)
ALBUMIN SERPL ELPH-MCNC: 3.9 G/DL — SIGNIFICANT CHANGE UP (ref 3.3–5.2)
ALP SERPL-CCNC: 58 U/L — SIGNIFICANT CHANGE UP (ref 40–120)
ALT FLD-CCNC: 13 U/L — SIGNIFICANT CHANGE UP
ANION GAP SERPL CALC-SCNC: 15 MMOL/L — SIGNIFICANT CHANGE UP (ref 5–17)
AST SERPL-CCNC: 27 U/L — SIGNIFICANT CHANGE UP
BASOPHILS # BLD AUTO: 0.02 K/UL — SIGNIFICANT CHANGE UP (ref 0–0.2)
BASOPHILS NFR BLD AUTO: 0.2 % — SIGNIFICANT CHANGE UP (ref 0–2)
BILIRUB SERPL-MCNC: 0.4 MG/DL — SIGNIFICANT CHANGE UP (ref 0.4–2)
BUN SERPL-MCNC: 8 MG/DL — SIGNIFICANT CHANGE UP (ref 8–20)
CALCIUM SERPL-MCNC: 8.7 MG/DL — SIGNIFICANT CHANGE UP (ref 8.6–10.2)
CHLORIDE SERPL-SCNC: 102 MMOL/L — SIGNIFICANT CHANGE UP (ref 98–107)
CO2 SERPL-SCNC: 22 MMOL/L — SIGNIFICANT CHANGE UP (ref 22–29)
COVID-19 SPIKE DOMAIN AB INTERP: POSITIVE
COVID-19 SPIKE DOMAIN ANTIBODY RESULT: >250 U/ML — HIGH
CREAT SERPL-MCNC: 0.46 MG/DL — LOW (ref 0.5–1.3)
EOSINOPHIL # BLD AUTO: 0 K/UL — SIGNIFICANT CHANGE UP (ref 0–0.5)
EOSINOPHIL NFR BLD AUTO: 0 % — SIGNIFICANT CHANGE UP (ref 0–6)
ESTIMATED AVERAGE GLUCOSE: 148 MG/DL — HIGH (ref 68–114)
GLUCOSE BLDC GLUCOMTR-MCNC: 128 MG/DL — HIGH (ref 70–99)
GLUCOSE SERPL-MCNC: 135 MG/DL — HIGH (ref 70–99)
HCT VFR BLD CALC: 35.7 % — SIGNIFICANT CHANGE UP (ref 34.5–45)
HGB BLD-MCNC: 12.1 G/DL — SIGNIFICANT CHANGE UP (ref 11.5–15.5)
IMM GRANULOCYTES NFR BLD AUTO: 0.5 % — SIGNIFICANT CHANGE UP (ref 0–1.5)
LYMPHOCYTES # BLD AUTO: 1.46 K/UL — SIGNIFICANT CHANGE UP (ref 1–3.3)
LYMPHOCYTES # BLD AUTO: 15.4 % — SIGNIFICANT CHANGE UP (ref 13–44)
MCHC RBC-ENTMCNC: 28.5 PG — SIGNIFICANT CHANGE UP (ref 27–34)
MCHC RBC-ENTMCNC: 33.9 GM/DL — SIGNIFICANT CHANGE UP (ref 32–36)
MCV RBC AUTO: 84.2 FL — SIGNIFICANT CHANGE UP (ref 80–100)
MONOCYTES # BLD AUTO: 0.71 K/UL — SIGNIFICANT CHANGE UP (ref 0–0.9)
MONOCYTES NFR BLD AUTO: 7.5 % — SIGNIFICANT CHANGE UP (ref 2–14)
NEUTROPHILS # BLD AUTO: 7.22 K/UL — SIGNIFICANT CHANGE UP (ref 1.8–7.4)
NEUTROPHILS NFR BLD AUTO: 76.4 % — SIGNIFICANT CHANGE UP (ref 43–77)
PLATELET # BLD AUTO: 157 K/UL — SIGNIFICANT CHANGE UP (ref 150–400)
POTASSIUM SERPL-MCNC: 3.7 MMOL/L — SIGNIFICANT CHANGE UP (ref 3.5–5.3)
POTASSIUM SERPL-SCNC: 3.7 MMOL/L — SIGNIFICANT CHANGE UP (ref 3.5–5.3)
PROT SERPL-MCNC: 6.9 G/DL — SIGNIFICANT CHANGE UP (ref 6.6–8.7)
RBC # BLD: 4.24 M/UL — SIGNIFICANT CHANGE UP (ref 3.8–5.2)
RBC # FLD: 13 % — SIGNIFICANT CHANGE UP (ref 10.3–14.5)
SARS-COV-2 IGG+IGM SERPL QL IA: >250 U/ML — HIGH
SARS-COV-2 IGG+IGM SERPL QL IA: POSITIVE
SARS-COV-2 RNA SPEC QL NAA+PROBE: SIGNIFICANT CHANGE UP
SODIUM SERPL-SCNC: 139 MMOL/L — SIGNIFICANT CHANGE UP (ref 135–145)
WBC # BLD: 9.46 K/UL — SIGNIFICANT CHANGE UP (ref 3.8–10.5)
WBC # FLD AUTO: 9.46 K/UL — SIGNIFICANT CHANGE UP (ref 3.8–10.5)

## 2021-09-15 PROCEDURE — 86901 BLOOD TYPING SEROLOGIC RH(D): CPT

## 2021-09-15 PROCEDURE — 72170 X-RAY EXAM OF PELVIS: CPT

## 2021-09-15 PROCEDURE — T1013: CPT

## 2021-09-15 PROCEDURE — 85610 PROTHROMBIN TIME: CPT

## 2021-09-15 PROCEDURE — C9399: CPT

## 2021-09-15 PROCEDURE — 36415 COLL VENOUS BLD VENIPUNCTURE: CPT

## 2021-09-15 PROCEDURE — 96375 TX/PRO/DX INJ NEW DRUG ADDON: CPT

## 2021-09-15 PROCEDURE — 73200 CT UPPER EXTREMITY W/O DYE: CPT | Mod: MA

## 2021-09-15 PROCEDURE — 82962 GLUCOSE BLOOD TEST: CPT

## 2021-09-15 PROCEDURE — 86900 BLOOD TYPING SEROLOGIC ABO: CPT

## 2021-09-15 PROCEDURE — U0003: CPT

## 2021-09-15 PROCEDURE — 86803 HEPATITIS C AB TEST: CPT

## 2021-09-15 PROCEDURE — U0005: CPT

## 2021-09-15 PROCEDURE — 80053 COMPREHEN METABOLIC PANEL: CPT

## 2021-09-15 PROCEDURE — 85730 THROMBOPLASTIN TIME PARTIAL: CPT

## 2021-09-15 PROCEDURE — 93005 ELECTROCARDIOGRAM TRACING: CPT

## 2021-09-15 PROCEDURE — 99285 EMERGENCY DEPT VISIT HI MDM: CPT

## 2021-09-15 PROCEDURE — 76000 FLUOROSCOPY <1 HR PHYS/QHP: CPT

## 2021-09-15 PROCEDURE — C1713: CPT

## 2021-09-15 PROCEDURE — 99232 SBSQ HOSP IP/OBS MODERATE 35: CPT

## 2021-09-15 PROCEDURE — 85025 COMPLETE CBC W/AUTO DIFF WBC: CPT

## 2021-09-15 PROCEDURE — 86850 RBC ANTIBODY SCREEN: CPT

## 2021-09-15 PROCEDURE — C1776: CPT

## 2021-09-15 PROCEDURE — 83036 HEMOGLOBIN GLYCOSYLATED A1C: CPT

## 2021-09-15 PROCEDURE — 86769 SARS-COV-2 COVID-19 ANTIBODY: CPT

## 2021-09-15 PROCEDURE — 96374 THER/PROPH/DIAG INJ IV PUSH: CPT

## 2021-09-15 PROCEDURE — 73080 X-RAY EXAM OF ELBOW: CPT

## 2021-09-15 PROCEDURE — 71045 X-RAY EXAM CHEST 1 VIEW: CPT

## 2021-09-15 RX ORDER — FAMOTIDINE 10 MG/ML
1 INJECTION INTRAVENOUS
Qty: 28 | Refills: 0
Start: 2021-09-15 | End: 2021-09-28

## 2021-09-15 RX ORDER — OXYCODONE HYDROCHLORIDE 5 MG/1
1 TABLET ORAL
Qty: 20 | Refills: 0
Start: 2021-09-15

## 2021-09-15 RX ORDER — ACETAMINOPHEN 500 MG
2 TABLET ORAL
Qty: 42 | Refills: 0
Start: 2021-09-15 | End: 2021-09-21

## 2021-09-15 RX ORDER — SENNOSIDES/DOCUSATE SODIUM 8.6MG-50MG
2 TABLET ORAL
Qty: 14 | Refills: 0
Start: 2021-09-15 | End: 2021-09-21

## 2021-09-15 RX ORDER — ASPIRIN/CALCIUM CARB/MAGNESIUM 324 MG
1 TABLET ORAL
Qty: 60 | Refills: 0
Start: 2021-09-15 | End: 2021-10-14

## 2021-09-15 RX ADMIN — FAMOTIDINE 20 MILLIGRAM(S): 10 INJECTION INTRAVENOUS at 05:44

## 2021-09-15 RX ADMIN — SODIUM CHLORIDE 125 MILLILITER(S): 9 INJECTION, SOLUTION INTRAVENOUS at 05:44

## 2021-09-15 RX ADMIN — OXYCODONE HYDROCHLORIDE 5 MILLIGRAM(S): 5 TABLET ORAL at 00:50

## 2021-09-15 RX ADMIN — Medication 100 MILLIGRAM(S): at 05:45

## 2021-09-15 RX ADMIN — OXYCODONE HYDROCHLORIDE 5 MILLIGRAM(S): 5 TABLET ORAL at 05:44

## 2021-09-15 RX ADMIN — OXYCODONE HYDROCHLORIDE 10 MILLIGRAM(S): 5 TABLET ORAL at 10:48

## 2021-09-15 NOTE — PROGRESS NOTE ADULT - ASSESSMENT
A/P: 62 y/o female s/p mechanical fall with left elbow fracture/dislocation, Hx of DM-2, HLD       left elbow fracture/dislocation s/p ORIF post op day 01.     Pain meds and DVT prophylaxis as per Ortho service  Bowel meds  will d/c IV fluids  Monitor CBC  PT eval.   will monitor vitals if hypotensive will give bolus of fluids.       DM: Accu checks to AC/HS while NPO, cont. IVF, add basal insulin coverage, hold Metformin until DC    HLD: low fat diet.     Patient is stable from the medicine point of view for discharge pending PT and Ortho eval.         
A/P: 62 y/o female s/p mechanical fall with left elbow fracture/dislocation, Hx of DM-2, HLD       left elbow fracture/dislocation:     -s/p reduction in ED, await ORIF in today  -Patient with normal exercise tolerance prior to event, no cardiac history, no overt signs of CHF, labs essentially normal   patient has no Hx of CHF, CKD or stroke, she is at low to intermediate risk for perioperative cardiovascular complication and is optimized from medicine point of view for planned procedure.   Pain meds and DVT prophylaxis as per Ortho service  Bowel meds  IV fluids  Monitor CBC  PT eval.   will monitor vitals if hypotensive will give bolus of fluids.       DM: Accu checks to AC/HS while NPO, cont. IVF, add basal insulin coverage, hold Metformin until DC    HLD: low fat diet    
ASSESSMENT: Patient is a 61y old female with PMH of DM presented s/p mechanical fall with isolated elbow fx, and no other injuries identified on exam.     PLAN:    - Tertiary exam following OR

## 2021-09-15 NOTE — PROGRESS NOTE ADULT - SUBJECTIVE AND OBJECTIVE BOX
Patient was seen and examined at approximately 725am      ORTHO-POST-OP PROGRESS NOTE:      [id number]    REYNALDO DIAZ      PROCEDURE: left elbow radial head replacement and open reduction internal fixation of olecranon     DOS:       SUBJECTIVE: 61y Patient seen and examined. Patient reports of moderate discomfort that is controlled by pain medications. Patient denies of acute sensory or motor changes.     Labs:                        12.1   9.46  )-----------( 157      ( 15 Sep 2021 08:02 )             35.7       09-13    140  |  102  |  16.7  ----------------------------<  190<H>  4.0   |  25.0  |  0.76    Ca    9.7      13 Sep 2021 18:50    TPro  7.4  /  Alb  4.3  /  TBili  0.3<L>  /  DBili  x   /  AST  28  /  ALT  18  /  AlkPhos  86  09-13          Medications:  dextrose 40% Gel 15 Gram(s) Oral once  dextrose 5%. 1000 milliLiter(s) IV Continuous <Continuous>  dextrose 5%. 1000 milliLiter(s) IV Continuous <Continuous>  dextrose 50% Injectable 25 Gram(s) IV Push once  dextrose 50% Injectable 12.5 Gram(s) IV Push once  dextrose 50% Injectable 25 Gram(s) IV Push once  diphenhydrAMINE 25 milliGRAM(s) Oral at bedtime PRN  famotidine    Tablet 20 milliGRAM(s) Oral every 12 hours  insulin glargine Injectable (LANTUS) 10 Unit(s) SubCutaneous at bedtime  insulin lispro (ADMELOG) corrective regimen sliding scale   SubCutaneous every 6 hours  lactated ringers. 1000 milliLiter(s) IV Continuous <Continuous>  magnesium hydroxide Suspension 30 milliLiter(s) Oral daily PRN  ondansetron Injectable 4 milliGRAM(s) IV Push every 6 hours PRN  oxyCODONE    IR 10 milliGRAM(s) Oral every 4 hours PRN  oxyCODONE    IR 5 milliGRAM(s) Oral every 4 hours PRN  prochlorperazine   Injectable 5 milliGRAM(s) IV Push once PRN  traMADol 50 milliGRAM(s) Oral every 4 hours PRN      Vitals:  T(C): 36.7 (09-15-21 @ 04:00), Max: 36.9 (09-14-21 @ 19:20)  HR: 68 (09-15-21 @ 04:00) (68 - 88)  BP: 142/85 (09-15-21 @ 04:00) (122/74 - 151/84)  RR: 18 (09-15-21 @ 04:00) (14 - 21)  SpO2: 95% (09-15-21 @ 04:00) (94% - 100%)  Wt(kg): --    I&O's Detail        PHYSICAL EXAM:     Constitutional: Alert, responsive, in no acute distress.     Injured Extremity:          Splint: Clean/dry/intact. sling is noted of the left UE.            Skin: Wound is clean and intact. Minimal left finger soft tissue swelling           Sensation: Light touch sensation is mildly diminished of all left fingers.                 Motor exam: 4+/5 finger motor function No focal weaknesses noted.  Normal thumb extension and flexion. No weaknesses found                 Vascular:  + warm well perfused; capillary refill <3 seconds                                                       A/P :  61y Female S/P left elbow radial head replacement and open reduction internal fixation of olecranon  POD# 1    -  Pain control  -  Weight bearing status:   NWB left elbow  -  Dispo: Home  today  - continue sling and splint 
TRAUMA SURGERY PROGRESS NOTE    Subjective:   Patient examined at bedside this AM. Reports she is still having pain in L elbow. No acute events overnight    Objective:  Vital Signs  T(C): 37.2 (09-14 @ 04:36), Max: 37.2 (09-14 @ 04:36)  HR: 74 (09-14 @ 04:36) (72 - 104)  BP: 122/79 (09-14 @ 04:36) (122/79 - 170/88)  RR: 18 (09-14 @ 04:36) (16 - 18)  SpO2: 97% (09-14 @ 04:36) (97% - 100%)    Physical Exam:  General: alert and oriented, NAD  Resp: airway patent, respirations unlabored, no increased WOB  Extremities: no edema, LUE splinted and wrapped  Skin: warm, dry, appropriate color    Labs:                        13.1   7.08  )-----------( 168      ( 13 Sep 2021 18:50 )             39.0   09-13    140  |  102  |  16.7  ----------------------------<  190<H>  4.0   |  25.0  |  0.76    Ca    9.7      13 Sep 2021 18:50    TPro  7.4  /  Alb  4.3  /  TBili  0.3<L>  /  DBili  x   /  AST  28  /  ALT  18  /  AlkPhos  86  09-13    CAPILLARY BLOOD GLUCOSE          Medications:   MEDICATIONS  (STANDING):  acetaminophen   Tablet .. 975 milliGRAM(s) Oral every 8 hours  ceFAZolin   IVPB 2000 milliGRAM(s) IV Intermittent once  dextrose 40% Gel 15 Gram(s) Oral once  dextrose 5%. 1000 milliLiter(s) (50 mL/Hr) IV Continuous <Continuous>  dextrose 5%. 1000 milliLiter(s) (100 mL/Hr) IV Continuous <Continuous>  dextrose 50% Injectable 25 Gram(s) IV Push once  dextrose 50% Injectable 12.5 Gram(s) IV Push once  dextrose 50% Injectable 25 Gram(s) IV Push once  glucagon  Injectable 1 milliGRAM(s) IntraMuscular once  insulin glargine Injectable (LANTUS) 10 Unit(s) SubCutaneous at bedtime  insulin lispro (ADMELOG) corrective regimen sliding scale   SubCutaneous every 6 hours  lactated ringers. 1000 milliLiter(s) (85 mL/Hr) IV Continuous <Continuous>    MEDICATIONS  (PRN):  HYDROmorphone   Tablet 4 milliGRAM(s) Oral every 4 hours PRN Severe Pain (7 - 10)  HYDROmorphone  Injectable 0.5 milliGRAM(s) IV Push every 6 hours PRN Breakthrough Pain  oxyCODONE    IR 5 milliGRAM(s) Oral every 4 hours PRN Mild Pain (1 - 3)  oxyCODONE    IR 10 milliGRAM(s) Oral every 4 hours PRN Moderate Pain (4 - 6)    
Orthopedic PA Postop Note  Patient S/P ORIF left olecranon & radial head replacement  Patient in bed comfortable   LEFT UPPER EXTREMITY  splint intact  radial Pulse intact, cap refill < 2sec  Palpable compartments soft and compressible  +flexion of all digits, abduction/extension of 1st digit diminished, +abduction/adduction/extension of digits 2-5  Sensation intact to light touch    Vital Signs Last 24 Hrs  T(C): 36.9 (14 Sep 2021 19:20), Max: 37.2 (14 Sep 2021 04:36)  T(F): 98.4 (14 Sep 2021 19:20), Max: 98.9 (14 Sep 2021 04:36)  HR: 78 (14 Sep 2021 20:20) (70 - 88)  BP: 138/88 (14 Sep 2021 20:20) (122/74 - 151/84)  BP(mean): 103 (14 Sep 2021 20:20) (97 - 104)  RR: 14 (14 Sep 2021 20:20) (14 - 21)  SpO2: 95% (14 Sep 2021 20:20) (94% - 100%)    A/P:  S/P  ORIF left olecranon & radial head replacement  1. DVTP - ASA  2. Physical Therapy   3. Pain Control as clinically indicated 
REYNALDO DIAZ    [id number]    61y      Female    Patient is a 61y old  Female who presents with a chief complaint of left elbow fx/dislocation (15 Sep 2021 08:29)      INTERVAL HPI/OVERNIGHT EVENTS:      patient's pain is better, she is s/p surgery, has no more dizziness, denies chest pain, sob, fever, chills, nausea, vomiting     REVIEW OF SYSTEMS:    CONSTITUTIONAL: No fever, has fatigue  RESPIRATORY: No cough, No shortness of breath  CARDIOVASCULAR: No chest pain, palpitations  GASTROINTESTINAL: No abdominal, No nausea, vomiting  NEUROLOGICAL: No headaches,  loss of strength.  MISCELLANEOUS: Left arm pain is better now        Vital Signs Last 24 Hrs  T(C): 36.7 (15 Sep 2021 04:00), Max: 36.9 (14 Sep 2021 19:20)  T(F): 98.1 (15 Sep 2021 04:00), Max: 98.4 (14 Sep 2021 19:20)  HR: 68 (15 Sep 2021 04:00) (68 - 88)  BP: 142/85 (15 Sep 2021 04:00) (122/74 - 151/84)  BP(mean): 102 (14 Sep 2021 20:40) (97 - 104)  RR: 18 (15 Sep 2021 04:00) (14 - 21)  SpO2: 95% (15 Sep 2021 04:00) (94% - 100%)    PHYSICAL EXAM:    GENERAL: Middle age female looking comfortable   HEENT: PERRL, +EOMI  NECK: soft, Supple, No JVD,   CHEST/LUNG: Clear to auscultate bilaterally; No wheezing  HEART: S1S2+, Regular rate and rhythm; No murmurs  ABDOMEN: Soft, Nontender, Nondistended; Bowel sounds present  EXTREMITIES:  1+ Peripheral Pulses, No edema, left arm clean dressings on, no bleeding or soaking   SKIN: No rashes or lesions  NEURO: AAOX3, no focal deficits, no motor r sensory loss  PSYCH: normal mood      LABS:                        12.1   9.46  )-----------( 157      ( 15 Sep 2021 08:02 )             35.7     09-15    139  |  102  |  8.0  ----------------------------<  135<H>  3.7   |  22.0  |  0.46<L>    Ca    8.7      15 Sep 2021 08:02    TPro  6.9  /  Alb  3.9  /  TBili  0.4  /  DBili  x   /  AST  27  /  ALT  13  /  AlkPhos  58  09-15    PT/INR - ( 13 Sep 2021 18:50 )   PT: 12.3 sec;   INR: 1.06 ratio         PTT - ( 13 Sep 2021 18:50 )  PTT:32.8 sec        I&O's Summary      MEDICATIONS  (STANDING):  dextrose 40% Gel 15 Gram(s) Oral once  dextrose 5%. 1000 milliLiter(s) (50 mL/Hr) IV Continuous <Continuous>  dextrose 5%. 1000 milliLiter(s) (100 mL/Hr) IV Continuous <Continuous>  dextrose 50% Injectable 25 Gram(s) IV Push once  dextrose 50% Injectable 12.5 Gram(s) IV Push once  dextrose 50% Injectable 25 Gram(s) IV Push once  famotidine    Tablet 20 milliGRAM(s) Oral every 12 hours  insulin glargine Injectable (LANTUS) 10 Unit(s) SubCutaneous at bedtime  insulin lispro (ADMELOG) corrective regimen sliding scale   SubCutaneous every 6 hours  lactated ringers. 1000 milliLiter(s) (125 mL/Hr) IV Continuous <Continuous>    MEDICATIONS  (PRN):  diphenhydrAMINE 25 milliGRAM(s) Oral at bedtime PRN Itching  magnesium hydroxide Suspension 30 milliLiter(s) Oral daily PRN Constipation  ondansetron Injectable 4 milliGRAM(s) IV Push every 6 hours PRN Nausea and/or Vomiting  oxyCODONE    IR 10 milliGRAM(s) Oral every 4 hours PRN Severe Pain (7 - 10)  oxyCODONE    IR 5 milliGRAM(s) Oral every 4 hours PRN Moderate Pain (4 - 6)  prochlorperazine   Injectable 5 milliGRAM(s) IV Push once PRN Nausea and/or Vomiting  traMADol 50 milliGRAM(s) Oral every 4 hours PRN Mild Pain (1 - 3)        
REYNALDO DIAZ    [id number]    61y      Female    Patient is a 61y old  Female who presents with a chief complaint of left elbow fx/dislocation (14 Sep 2021 06:19)      INTERVAL HPI/OVERNIGHT EVENTS:    patient's has pain in the left arm, feels little dizziness, denies chest pain, sob, fever, chills, nausea, vomiting     REVIEW OF SYSTEMS:    CONSTITUTIONAL: No fever, has fatigue  RESPIRATORY: No cough, No shortness of breath  CARDIOVASCULAR: No chest pain, palpitations  GASTROINTESTINAL: No abdominal, No nausea, vomiting  NEUROLOGICAL: No headaches,  loss of strength.  MISCELLANEOUS: Left arm pain       Vital Signs Last 24 Hrs  T(C): 36.9 (14 Sep 2021 08:26), Max: 37.2 (14 Sep 2021 04:36)  T(F): 98.4 (14 Sep 2021 08:26), Max: 98.9 (14 Sep 2021 04:36)  HR: 74 (14 Sep 2021 08:26) (72 - 104)  BP: 127/78 (14 Sep 2021 08:26) (122/79 - 170/88)  RR: 18 (14 Sep 2021 08:26) (16 - 18)  SpO2: 96% (14 Sep 2021 08:26) (96% - 100%)    PHYSICAL EXAM:    GENERAL: Middle age female looking comfortable   HEENT: PERRL, +EOMI  NECK: soft, Supple, No JVD,   CHEST/LUNG: Clear to auscultate bilaterally; No wheezing  HEART: S1S2+, Regular rate and rhythm; No murmurs  ABDOMEN: Soft, Nontender, Nondistended; Bowel sounds present  EXTREMITIES:  1+ Peripheral Pulses, No edema, left arm with sling on.   SKIN: No rashes or lesions  NEURO: AAOX3, no focal deficits, no motor r sensory loss  PSYCH: normal mood      LABS:                        13.1   7.08  )-----------( 168      ( 13 Sep 2021 18:50 )             39.0     09-13    140  |  102  |  16.7  ----------------------------<  190<H>  4.0   |  25.0  |  0.76    Ca    9.7      13 Sep 2021 18:50    TPro  7.4  /  Alb  4.3  /  TBili  0.3<L>  /  DBili  x   /  AST  28  /  ALT  18  /  AlkPhos  86  09-13    PT/INR - ( 13 Sep 2021 18:50 )   PT: 12.3 sec;   INR: 1.06 ratio         PTT - ( 13 Sep 2021 18:50 )  PTT:32.8 sec        I&O's Summary      MEDICATIONS  (STANDING):  acetaminophen   Tablet .. 975 milliGRAM(s) Oral every 8 hours  ceFAZolin   IVPB 2000 milliGRAM(s) IV Intermittent once  dextrose 40% Gel 15 Gram(s) Oral once  dextrose 5%. 1000 milliLiter(s) (50 mL/Hr) IV Continuous <Continuous>  dextrose 5%. 1000 milliLiter(s) (100 mL/Hr) IV Continuous <Continuous>  dextrose 50% Injectable 25 Gram(s) IV Push once  dextrose 50% Injectable 12.5 Gram(s) IV Push once  dextrose 50% Injectable 25 Gram(s) IV Push once  glucagon  Injectable 1 milliGRAM(s) IntraMuscular once  insulin glargine Injectable (LANTUS) 10 Unit(s) SubCutaneous at bedtime  insulin lispro (ADMELOG) corrective regimen sliding scale   SubCutaneous every 6 hours  lactated ringers. 1000 milliLiter(s) (85 mL/Hr) IV Continuous <Continuous>    MEDICATIONS  (PRN):  HYDROmorphone   Tablet 4 milliGRAM(s) Oral every 4 hours PRN Severe Pain (7 - 10)  HYDROmorphone  Injectable 0.5 milliGRAM(s) IV Push every 6 hours PRN Breakthrough Pain  oxyCODONE    IR 5 milliGRAM(s) Oral every 4 hours PRN Mild Pain (1 - 3)  oxyCODONE    IR 10 milliGRAM(s) Oral every 4 hours PRN Moderate Pain (4 - 6)

## 2021-09-15 NOTE — PHYSICAL THERAPY INITIAL EVALUATION ADULT - ADDITIONAL COMMENTS
Pt reports independent PTA, owns no DME. Lives with daughter who can assist if needed and daughter drives.

## 2021-09-15 NOTE — DISCHARGE NOTE NURSING/CASE MANAGEMENT/SOCIAL WORK - PATIENT PORTAL LINK FT
You can access the FollowMyHealth Patient Portal offered by [facility] by registering at the following website: [email or web address]. By joining [facility]’s FollowMyHealth portal, you will also be able to view your health information using other applications (apps) compatible with our system.

## 2021-09-15 NOTE — PHYSICAL THERAPY INITIAL EVALUATION ADULT - PERTINENT HX OF CURRENT PROBLEM, REHAB EVAL
61y Female S/P left elbow radial head replacement and open reduction internal fixation of olecranon secondary to fall down steps

## 2021-09-15 NOTE — PROGRESS NOTE ADULT - REASON FOR ADMISSION
left elbow fx/dislocation

## 2021-09-25 NOTE — CDI QUERY NOTE - NSCDIOTHERTXTBX_GEN_ALL_CORE_HH
Glucose, Serum:   Glucose, Serum: 135 mg/dL (09.15.21 @ 08:02)   Glucose, Serum: 190 mg/dL (09.13.21 @ 18:50)    POCT Blood Glucose.:  POCT Blood Glucose.: 128 mg/dL (09.15.21 @ 05:43)   POCT Blood Glucose.: 218 mg/dL (09.14.21 @ 23:46)   POCT Blood Glucose.: 217 mg/dL (09.14.21 @ 22:19)   POCT Blood Glucose.: 228 mg/dL (09.14.21 @ 17:41)   POCT Blood Glucose.: 148 mg/dL (09.14.21 @ 07:00)     A1C with Estimated Average Glucose Result:  A1C with Estimated Average Glucose Result: 6.8 % (09.15.21 @ 08:02)   A1C with Estimated Average Glucose Result: 6.8 % (09.14.21 @ 07:08)       Can you please clarify if above glucose levels support a clinically significant diagnosis?  A.	Diabetes mellitus with hyperglycemia  B.	Other, please specify  C.	Not clinically significant      Chart documentation:    Assessment and Plan:   • Assessment	  A/P: 60 y/o female s/p mechanical fall with left elbow fracture/dislocation, Hx of DM-2, HLD    DM: Accu checks to AC/HS while NPO, cont. IVF, add basal insulin coverage, hold Metformin until DC        insulin lispro (ADMELOG) corrective regimen sliding scale   SubCutaneous every 6 hours  insulin glargine Injectable (LANTUS) 10 Unit(s) SubCutaneous at bedtime

## 2021-09-29 ENCOUNTER — APPOINTMENT (OUTPATIENT)
Dept: ORTHOPEDIC SURGERY | Facility: CLINIC | Age: 61
End: 2021-09-29
Payer: MEDICAID

## 2021-09-29 PROCEDURE — 99024 POSTOP FOLLOW-UP VISIT: CPT

## 2021-09-29 PROCEDURE — 73080 X-RAY EXAM OF ELBOW: CPT | Mod: LT

## 2021-09-29 NOTE — HISTORY OF PRESENT ILLNESS
[Clean/Dry/Intact] : clean, dry and intact [Erythema] : not erythematous [Discharge] : absent of discharge [Swelling] : swollen [Neuro Intact] : an unremarkable neurological exam [Vascular Intact] : ~T peripheral vascular exam normal [Doing Well] : is doing well [Excellent Pain Control] : has excellent pain control [No Sign of Infection] : is showing no signs of infection [de-identified] : s/p open reduction with internal fixation, left proximal ulna fracture. 9/14/21\par  [de-identified] : The patient is a pleasant 61-year-old Maori-speaking woman who presents today s/p open reduction with internal fixation, left proximal ulna fracture. 9/14/21\par She has been doing well.  She has been wearing her splint. [de-identified] : Minimal pain with gentle elbow range of motion [de-identified] : 3 views of the left elbow were obtained today.  They show the patient status post a radial head replacement with ORIF of an ulna fracture [de-identified] : We will continue with the postoperative plan.  Gentle range of motion with hinged range of motion brace.  Occupational and physical therapy to work on elbow range of motion, edema control, finger range of motion, wrist range of motion.  Follow-up 1 month with repeat x-rays at which time we will likely wean out of the brace.

## 2021-10-27 ENCOUNTER — APPOINTMENT (OUTPATIENT)
Dept: ORTHOPEDIC SURGERY | Facility: CLINIC | Age: 61
End: 2021-10-27
Payer: COMMERCIAL

## 2021-10-27 PROCEDURE — 73080 X-RAY EXAM OF ELBOW: CPT | Mod: LT

## 2021-10-27 PROCEDURE — 99024 POSTOP FOLLOW-UP VISIT: CPT

## 2021-10-27 NOTE — HISTORY OF PRESENT ILLNESS
[Clean/Dry/Intact] : clean, dry and intact [Erythema] : not erythematous [Discharge] : absent of discharge [Swelling] : swollen [Neuro Intact] : an unremarkable neurological exam [Vascular Intact] : ~T peripheral vascular exam normal [Doing Well] : is doing well [Excellent Pain Control] : has excellent pain control [No Sign of Infection] : is showing no signs of infection [None] : None [de-identified] : s/p open reduction with internal fixation, left proximal ulna fracture. 9/14/21\par  [de-identified] : The patient is a pleasant 61-year-old Irish-speaking woman who presents today s/p open reduction with internal fixation, left proximal ulna fracture. 9/14/21\par She has been doing well.  She has been wearing her ROM brace. [de-identified] : Physical Exam:\par General: Well appearing, no acute distress, A&O\par Neurologic: A&Ox3, No focal deficits\par Head: NCAT without abrasions, lacerations, or ecchymosis to head, face, or scalp\par Respiratory: Equal chest wall expansion bilaterally, no accessory muscle use\par Lymphatic: No lymphadenopathy palpated\par Skin: Warm and dry\par Psychiatric: Normal mood and affect\par \par LUE:\par SILT r/m/u\par Fires AIN/PIN/ulnar\par 2+ radial pulse\par brisk capillary refill\par Supination 45, pronation 45\par Flexion 110 degrees to 40 degrees of extension [de-identified] : 3 views of the left elbow were obtained today.  They show the patient status post a radial head replacement with ORIF of an ulna fracture [de-identified] : We will continue with the postoperative plan.  We will advance her to full range of motion and she can start to discontinue the hinged range of motion brace.  Occupational and physical therapy to work on elbow range of motion, edema control, finger range of motion, wrist range of motion.  She can also start to advance to full weightbearing as she improves.  If she continues to do quite well, she may follow-up as needed but if she is still having symptoms or feels that she is unable to return to work, likely have her come back in about 6 weeks with repeat x-rays.

## 2022-01-25 ENCOUNTER — APPOINTMENT (OUTPATIENT)
Dept: ORTHOPEDIC SURGERY | Facility: CLINIC | Age: 62
End: 2022-01-25
Payer: MEDICAID

## 2022-01-25 VITALS
BODY MASS INDEX: 31.85 KG/M2 | WEIGHT: 158 LBS | HEIGHT: 59 IN | HEART RATE: 74 BPM | SYSTOLIC BLOOD PRESSURE: 119 MMHG | TEMPERATURE: 98.1 F | DIASTOLIC BLOOD PRESSURE: 75 MMHG

## 2022-01-25 DIAGNOSIS — S42.402D UNSPECIFIED FRACTURE OF LOWER END OF LEFT HUMERUS, SUBSEQUENT ENCOUNTER FOR FRACTURE WITH ROUTINE HEALING: ICD-10-CM

## 2022-01-25 PROCEDURE — 73080 X-RAY EXAM OF ELBOW: CPT | Mod: LT

## 2022-01-25 PROCEDURE — 99214 OFFICE O/P EST MOD 30 MIN: CPT

## 2022-01-25 NOTE — REASON FOR VISIT
[Follow-Up Visit] : a follow-up visit for [Other: ______] : provided by [facility] [FreeTextEntry2] : left elbow pain [Interpreters_IDNumber] : [id number] [Interpreters_FullName] : Miranda [[email or web address]] : Citizen of [place]

## 2022-01-25 NOTE — PHYSICAL EXAM
[de-identified] : Physical Exam:\par General: Well appearing, no acute distress, A&O\par Neurologic: A&Ox3, No focal deficits\par Head: NCAT without abrasions, lacerations, or ecchymosis to head, face, or scalp\par Respiratory: Equal chest wall expansion bilaterally, no accessory muscle use\par Lymphatic: No lymphadenopathy palpated\par Skin: Warm and dry\par Psychiatric: Normal mood and affect\par \par LUE:\par SILT r/m/u\par Fires AIN/PIN/ulnar\par 2+ radial pulse\par brisk capillary refill\par Supination 45, pronation 45\par Flexion 110 degrees to 10 degrees of extension.  [de-identified] : 3 views of the left elbow were obtained today. They show the patient status post a radial head replacement with ORIF of an ulna fracture.

## 2022-01-25 NOTE — DISCUSSION/SUMMARY
[de-identified] : 61-year-old female status post left elbow fracture dislocation.  She still having occasional elbow pain, we discussed this is very common given the severity of her injury.  She is allowed full range of motion without restriction and can advance to weightbearing as tolerated.  As long as she continues to improve, she may follow-up as needed.  Additional physical therapy was ordered today.  I would recommend a bone health evaluation as well.  Osteopenia and osteoporosis are significant risk factors for fragility fractures. Given the type of fracture that has occurred, I would highly recommend that the patient followup with their primary care physician to discuss treatment for low bone mineral density. We discussed the benefits of these medications frequently far outweigh the small risks. Their primary care physician can call the office with any specific questions or concerns.\par \par The patient was given the opportunity to ask questions and all questions were answered to their satisfaction.\par \par Steven Martell MD\par Orthopaedic Trauma Surgeon\par [facility]\par [facility]\par Director Orthopaedic Trauma, [facility]\par \par \par \par

## 2022-01-25 NOTE — HISTORY OF PRESENT ILLNESS
[3] : a current pain level of 3/10 [Bending] : worsened by bending [Lifting] : worsened by lifting [Weight Bearing] : worsened by weight bearing [Recumbency] : relieved by recumbency [Rest] : relieved by rest [de-identified] : The patient is a pleasant 61-year-old Hungarian-speaking woman who presents today s/p open reduction with internal fixation, left proximal ulna fracture. 9/14/21\par She has been doing well but still having elbow pain with activity.

## 2022-11-22 ENCOUNTER — OFFICE (OUTPATIENT)
Dept: URBAN - METROPOLITAN AREA CLINIC 94 | Facility: CLINIC | Age: 62
Setting detail: OPHTHALMOLOGY
End: 2022-11-22
Payer: MEDICAID

## 2022-11-22 DIAGNOSIS — E11.9: ICD-10-CM

## 2022-11-22 DIAGNOSIS — H11.31: ICD-10-CM

## 2022-11-22 PROCEDURE — 92014 COMPRE OPH EXAM EST PT 1/>: CPT | Performed by: OPHTHALMOLOGY

## 2022-11-22 ASSESSMENT — REFRACTION_MANIFEST
OS_ADD: +1.75
OD_AXIS: 25
OD_SPHERE: +2.00
OS_SPHERE: +2.00
OS_CYLINDER: -0.50
OD_ADD: +1.75
OS_VA1: 20/20
OD_VA1: 20/20
OS_AXIS: 120
OD_CYLINDER: -0.50

## 2022-11-22 ASSESSMENT — KERATOMETRY
OD_K1POWER_DIOPTERS: 43.75
OD_AXISANGLE_DEGREES: 102
OS_K2POWER_DIOPTERS: 45.00
OS_AXISANGLE_DEGREES: 066
OS_K1POWER_DIOPTERS: 43.75
OD_K2POWER_DIOPTERS: 45.75

## 2022-11-22 ASSESSMENT — CONFRONTATIONAL VISUAL FIELD TEST (CVF)
OS_FINDINGS: FULL
OD_FINDINGS: FULL

## 2022-11-22 ASSESSMENT — REFRACTION_AUTOREFRACTION
OS_SPHERE: +2.25
OD_AXIS: 025
OD_CYLINDER: -0.50
OD_SPHERE: +2.25
OS_AXIS: 121
OS_CYLINDER: -0.50

## 2022-11-22 ASSESSMENT — AXIALLENGTH_DERIVED
OD_AL: 22.4153
OS_AL: 22.6296
OD_AL: 22.5036
OS_AL: 22.5404

## 2022-11-22 ASSESSMENT — TONOMETRY
OD_IOP_MMHG: 17
OS_IOP_MMHG: 15

## 2022-11-22 ASSESSMENT — SPHEQUIV_DERIVED
OD_SPHEQUIV: 1.75
OD_SPHEQUIV: 2
OS_SPHEQUIV: 2
OS_SPHEQUIV: 1.75

## 2022-11-22 ASSESSMENT — VISUAL ACUITY
OD_BCVA: 20/30-
OS_BCVA: 20/40-

## 2023-03-15 NOTE — ED PROVIDER NOTE - CADM POA CENTRAL LINE
Lang Woody - do you know of any ophthalmologists that are self pay? Pt lost insurance coverage at end of February. No

## 2024-06-08 ENCOUNTER — NON-APPOINTMENT (OUTPATIENT)
Age: 64
End: 2024-06-08

## 2024-07-10 ENCOUNTER — NON-APPOINTMENT (OUTPATIENT)
Age: 64
End: 2024-07-10

## 2024-07-11 ENCOUNTER — APPOINTMENT (OUTPATIENT)
Dept: OBGYN | Facility: CLINIC | Age: 64
End: 2024-07-11
Payer: MEDICAID

## 2024-07-11 VITALS
WEIGHT: 153.13 LBS | DIASTOLIC BLOOD PRESSURE: 80 MMHG | SYSTOLIC BLOOD PRESSURE: 130 MMHG | HEIGHT: 59 IN | BODY MASS INDEX: 30.87 KG/M2

## 2024-07-11 DIAGNOSIS — R73.03 PREDIABETES.: ICD-10-CM

## 2024-07-11 DIAGNOSIS — Z83.3 FAMILY HISTORY OF DIABETES MELLITUS: ICD-10-CM

## 2024-07-11 DIAGNOSIS — Z12.39 ENCOUNTER FOR OTHER SCREENING FOR MALIGNANT NEOPLASM OF BREAST: ICD-10-CM

## 2024-07-11 DIAGNOSIS — Z01.419 ENCOUNTER FOR GYNECOLOGICAL EXAMINATION (GENERAL) (ROUTINE) W/OUT ABNORMAL FINDINGS: ICD-10-CM

## 2024-07-11 DIAGNOSIS — Z78.0 ASYMPTOMATIC MENOPAUSAL STATE: ICD-10-CM

## 2024-07-11 DIAGNOSIS — Z13.820 ENCOUNTER FOR SCREENING FOR OSTEOPOROSIS: ICD-10-CM

## 2024-07-11 PROCEDURE — 99386 PREV VISIT NEW AGE 40-64: CPT

## 2024-07-11 PROCEDURE — 99459 PELVIC EXAMINATION: CPT

## 2024-07-11 RX ORDER — METFORMIN HYDROCHLORIDE 500 MG/1
500 TABLET, COATED ORAL
Refills: 0 | Status: ACTIVE | COMMUNITY

## 2024-07-14 LAB — HPV HIGH+LOW RISK DNA PNL CVX: NOT DETECTED

## 2024-07-17 LAB — CYTOLOGY CVX/VAG DOC THIN PREP: ABNORMAL

## 2024-10-29 ENCOUNTER — NON-APPOINTMENT (OUTPATIENT)
Age: 64
End: 2024-10-29

## 2024-10-29 DIAGNOSIS — Z87.39 PERSONAL HISTORY OF OTHER DISEASES OF THE MUSCULOSKELETAL SYSTEM AND CONNECTIVE TISSUE: ICD-10-CM

## 2024-10-31 ENCOUNTER — APPOINTMENT (OUTPATIENT)
Age: 64
End: 2024-10-31
Payer: MEDICAID

## 2024-10-31 VITALS — BODY MASS INDEX: 31.25 KG/M2 | HEIGHT: 59 IN | WEIGHT: 155 LBS

## 2024-10-31 DIAGNOSIS — E11.40 TYPE 2 DIABETES MELLITUS WITH DIABETIC NEUROPATHY, UNSPECIFIED: ICD-10-CM

## 2024-10-31 DIAGNOSIS — M79.672 PAIN IN LEFT FOOT: ICD-10-CM

## 2024-10-31 DIAGNOSIS — M21.6X1 OTHER ACQUIRED DEFORMITIES OF RIGHT FOOT: ICD-10-CM

## 2024-10-31 DIAGNOSIS — M79.671 PAIN IN RIGHT FOOT: ICD-10-CM

## 2024-10-31 DIAGNOSIS — M21.6X2 OTHER ACQUIRED DEFORMITIES OF LEFT FOOT: ICD-10-CM

## 2024-10-31 DIAGNOSIS — B35.3 TINEA PEDIS: ICD-10-CM

## 2024-10-31 DIAGNOSIS — B35.1 TINEA UNGUIUM: ICD-10-CM

## 2024-10-31 PROCEDURE — 99204 OFFICE O/P NEW MOD 45 MIN: CPT | Mod: 25

## 2024-10-31 PROCEDURE — 11721 DEBRIDE NAIL 6 OR MORE: CPT | Mod: Q8

## 2024-10-31 RX ORDER — CLOTRIMAZOLE 10 MG/G
1 CREAM TOPICAL
Qty: 1 | Refills: 3 | Status: ACTIVE | COMMUNITY
Start: 2024-10-31 | End: 1900-01-01

## 2025-01-21 ENCOUNTER — APPOINTMENT (OUTPATIENT)
Age: 65
End: 2025-01-21

## 2025-04-03 ENCOUNTER — APPOINTMENT (OUTPATIENT)
Age: 65
End: 2025-04-03
Payer: MEDICAID

## 2025-04-03 VITALS — WEIGHT: 293 LBS | BODY MASS INDEX: 59.07 KG/M2 | HEIGHT: 59 IN

## 2025-04-03 DIAGNOSIS — L84 CORNS AND CALLOSITIES: ICD-10-CM

## 2025-04-03 DIAGNOSIS — E11.40 TYPE 2 DIABETES MELLITUS WITH DIABETIC NEUROPATHY, UNSPECIFIED: ICD-10-CM

## 2025-04-03 DIAGNOSIS — B35.1 TINEA UNGUIUM: ICD-10-CM

## 2025-04-03 DIAGNOSIS — M79.672 PAIN IN LEFT FOOT: ICD-10-CM

## 2025-04-03 DIAGNOSIS — B35.3 TINEA PEDIS: ICD-10-CM

## 2025-04-03 DIAGNOSIS — L85.3 XEROSIS CUTIS: ICD-10-CM

## 2025-04-03 DIAGNOSIS — M79.671 PAIN IN RIGHT FOOT: ICD-10-CM

## 2025-04-03 PROCEDURE — 99213 OFFICE O/P EST LOW 20 MIN: CPT | Mod: 25

## 2025-04-03 PROCEDURE — 11720 DEBRIDE NAIL 1-5: CPT | Mod: 59,Q8

## 2025-04-03 PROCEDURE — 11056 PARNG/CUTG B9 HYPRKR LES 2-4: CPT | Mod: Q8

## 2025-04-03 RX ORDER — HYDROCORTISONE 1 %
12 CREAM (GRAM) TOPICAL TWICE DAILY
Qty: 1 | Refills: 3 | Status: ACTIVE | COMMUNITY
Start: 2025-04-03 | End: 1900-01-01

## 2025-04-03 RX ORDER — CLOTRIMAZOLE 10 MG/G
1 CREAM TOPICAL
Qty: 1 | Refills: 4 | Status: ACTIVE | COMMUNITY
Start: 2025-04-03 | End: 1900-01-01

## 2025-06-12 ENCOUNTER — APPOINTMENT (OUTPATIENT)
Age: 65
End: 2025-06-12
